# Patient Record
Sex: FEMALE | Race: BLACK OR AFRICAN AMERICAN | Employment: PART TIME | URBAN - METROPOLITAN AREA
[De-identification: names, ages, dates, MRNs, and addresses within clinical notes are randomized per-mention and may not be internally consistent; named-entity substitution may affect disease eponyms.]

---

## 2020-03-03 ENCOUNTER — OFFICE VISIT (OUTPATIENT)
Dept: ENDOCRINOLOGY | Age: 40
End: 2020-03-03

## 2020-03-03 VITALS
DIASTOLIC BLOOD PRESSURE: 70 MMHG | HEIGHT: 64 IN | BODY MASS INDEX: 31.92 KG/M2 | HEART RATE: 105 BPM | WEIGHT: 187 LBS | SYSTOLIC BLOOD PRESSURE: 112 MMHG

## 2020-03-03 DIAGNOSIS — E10.9 TYPE 1 DIABETES MELLITUS WITHOUT COMPLICATION (HCC): Primary | ICD-10-CM

## 2020-03-03 LAB — HBA1C MFR BLD HPLC: 7.4 %

## 2020-03-03 RX ORDER — TRAMADOL HYDROCHLORIDE 50 MG/1
TABLET ORAL
COMMUNITY
Start: 2020-01-24 | End: 2020-09-09

## 2020-03-03 RX ORDER — INSULIN HUMAN 100 [IU]/ML
INJECTION, SUSPENSION SUBCUTANEOUS
COMMUNITY
Start: 2020-02-20 | End: 2020-03-03 | Stop reason: ALTCHOICE

## 2020-03-03 RX ORDER — INSULIN LISPRO 100 [IU]/ML
INJECTION, SOLUTION INTRAVENOUS; SUBCUTANEOUS
Qty: 15 PEN | Refills: 3 | Status: SHIPPED | OUTPATIENT
Start: 2020-03-03 | End: 2020-09-09 | Stop reason: SDUPTHER

## 2020-03-03 RX ORDER — PEN NEEDLE, DIABETIC 31 GX3/16"
NEEDLE, DISPOSABLE MISCELLANEOUS
Qty: 400 PEN NEEDLE | Refills: 3 | Status: SHIPPED | OUTPATIENT
Start: 2020-03-03 | End: 2021-02-09 | Stop reason: SDUPTHER

## 2020-03-03 RX ORDER — INSULIN GLARGINE 100 [IU]/ML
INJECTION, SOLUTION SUBCUTANEOUS
Qty: 10 PEN | Refills: 3 | Status: SHIPPED | OUTPATIENT
Start: 2020-03-03 | End: 2020-09-09 | Stop reason: SDUPTHER

## 2020-03-03 RX ORDER — SYRING-NEEDL,DISP,INSUL,0.3 ML 31GX15/64"
SYRINGE, EMPTY DISPOSABLE MISCELLANEOUS
COMMUNITY
Start: 2020-02-20

## 2020-03-03 NOTE — PROGRESS NOTES
CONSULTATION REQUESTED BY: Rajendra Wilkinson NP     REASON FOR CONSULT:  Uncontrolled type 1 diabetes    CHIEF COMPLAINT: evaluation of type 1 diabetes    HISTORY OF PRESENT ILLNESS:   Eduarda Stauffer is a 44 y.o. female with a PMHx as noted below who presents for evaluation of uncontrolled type 1 diabetes.     Diabetes History:  Diabetes was diagnosed 14 years ago,   Hospitalized \"too many times to count\"  Family History of diabetes is + in maternal grandmother and older brother (type 2)  A1c 7.4% today, reports was around 9% a couple months ago    Current Home Regimen:  - humulin 70-30: 25 units AM, 30 units bedtime (rather than dinner)    Review of home glucose:  High variability   29 readings in the past 30 days  Frequent lows between 1 AM and 3:30 AM in the 40's  Highs and lows during the day at random: 20's - >400    Review of most recent diabetes-related labs:  Lab Results   Component Value Date    HBA1C 9.7 (H) 2013    HBA1C 10.9 (H) 2011    HBA1C 11.0 (H) 2011    GFRAA >60 2016    GFRNA >60 2016    TSH 3.14 2011     Lab Key:  [id number] = IA-2 pancreatic islet cell autoantibody  CPEPL = C-peptide level  :EXT = External Lab  GADLT = CATERINA-65 autoantibody   INSUL = Insulin level  MCACR (or MALBEXT) = Urine Microalbumin (or External UM)  B12LT = B12 level    PAST MEDICAL/SURGICAL HISTORY:   Past Medical History:   Diagnosis Date    Diabetes ([facility] [address]     type 1; endocrinologist at [facility] Other ill-defined conditions(799.89)     DKA    PNA (pneumonia)     ; strep pneumococcus    Strep pharyngitis 3/13    with sepsis     Past Surgical History:   Procedure Laterality Date    HX GYN          HX ORTHOPAEDIC      left foot surgery       ALLERGIES:   No Known Allergies    MEDICATIONS ON ADMISSION:     Current Outpatient Medications:     HUMULIN 70/30 U-100 INSULIN 100 unit/mL (70-30) injection, inject 25 units subcutaneously every morning and 30 units AT NIGHT, Disp: , Rfl:     albuterol (PROAIR HFA) 90 mcg/actuation inhaler, Take 1 Puff by inhalation every four (4) hours as needed for Wheezing., Disp: 1 Inhaler, Rfl: 0    BD VEO INSULIN SYRINGE UF 1 mL 31 gauge x 15/64\" syrg, use 1 SYRINGE to inject MEDICATION subcutaneously twice a day, Disp: , Rfl:     traMADol (ULTRAM) 50 mg tablet, take 1 tablet by mouth every 8 hours if needed for pain, Disp: , Rfl:     BD INSULIN SYRINGE ULT-FINE II 1/2 mL 31 x 5/16\" Syrg, use as directed WITH INSULIN TWICE A DAY, Disp: 60 Each, Rfl: 11    SOCIAL HISTORY:   Social History     Socioeconomic History    Marital status: SINGLE     Spouse name: Not on file    Number of children: Not on file    Years of education: Not on file    Highest education level: Not on file   Occupational History    Not on file   Social Needs    Financial resource strain: Not on file    Food insecurity:     Worry: Not on file     Inability: Not on file    Transportation needs:     Medical: Not on file     Non-medical: Not on file   Tobacco Use    Smoking status: Current Every Day Smoker     Packs/day: 0.50     Types: Cigarettes    Smokeless tobacco: Never Used    Tobacco comment: \"its been a long time\"   Substance and Sexual Activity    Alcohol use: Yes     Comment: two times a week \"sometimes a little bit and sometimes a lot\"    Drug use: No     Types: Prescription     Comment: hx smoking marijuana in her teens    Sexual activity: Not on file   Lifestyle    Physical activity:     Days per week: Not on file     Minutes per session: Not on file    Stress: Not on file   Relationships    Social connections:     Talks on phone: Not on file     Gets together: Not on file     Attends Tenriism service: Not on file     Active member of club or organization: Not on file     Attends meetings of clubs or organizations: Not on file     Relationship status: Not on file    Intimate partner violence:     Fear of current or ex partner: Not on file     Emotionally abused: Not on file     Physically abused: Not on file     Forced sexual activity: Not on file   Other Topics Concern    Not on file   Social History Narrative    Not on file       FAMILY HISTORY:  Family History   Problem Relation Age of Onset    Diabetes Mother     Heart Disease Mother     Cancer Father        REVIEW OF SYSTEMS: Complete ROS assessed and noted for that which is described above, all else are negative. Eyes: normal  ENT: normal  CVS: normal  Resp: normal  GI: normal  : normal  GYN: normal  Endocrine: normal  Integument: normal  Musculoskeletal: normal  Neuro: normal  Psych: normal      PHYSICAL EXAMINATION:    VITAL SIGNS:  Visit Vitals  /70 (BP 1 Location: Left arm, BP Patient Position: Sitting)   Pulse (!) 105   Ht 5' 4\" (1.626 m)   Wt 187 lb (84.8 kg)   BMI 32.10 kg/m²       GENERAL: NCAT, Sitting comfortably, NAD  EYES: EOMI, non-icteric, no proptosis  Ear/Nose/Throat: NCAT, no inflammation, no masses  LYMPH NODES: No LAD  CARDIOVASCULAR: S1 S2, RRR, No murmur, 2+ radial pulses  RESPIRATORY: CTA b/l, no wheeze/rales  GASTROINTESTINAL: NT, ND  MUSCULOSKELETAL: Normal ROM, no atrophy  SKIN: warm, no edema/rash/ or other skin changes  NEUROLOGIC: 5/5 power all extremities, no tremor, AAOx3  PSYCHIATRIC: Normal affect, Normal insight and judgement    Diabetic foot exam:     Left Foot:   Visual Exam: callous - early  callus, flat   Pulse DP: 2+ (normal)   Filament test: normal sensation    Vibratory sensation: Vibratory sensation: normal       Right Foot:  (Wearing CAST on foot, below measures not possible)   Visual Exam: normal    Pulse DP: 2+ (normal)   Filament test: normal sensation    Vibratory sensation: Vibratory sensation: normal      REVIEW OF LABORATORY AND RADIOLOGY DATA:   Labs and documentation have been reviewed as described above.      ASSESSMENT AND PLAN:   Mavis Wild is a 44 y.o. female with a PMHx as noted above who presents for evaluation of uncontrolled type 1 diabetes. Problems:  Type 1 diabetes Uncontrolled    We had the pleasure of reviewing together the basics of diabetes including basic pathophysiology and diabetes care. We further discussed the importance of checking home glucose regularly and takin all of their scheduled medications in order to have the best possible outcome. I was able to answer any questions they had in clinic today and they are invited to reach me if they have any further questions. Based upon our discussion together today we have decided to make the following changes:    Patient is using 55 units of insulin per day, using 70/30 insulin. Her sugars are highly variable and with numbers >400 and numbers as low as the 20's. She has had multiple hospitalizations due to the same. In her case it would be an important first step to transition away from 70/30 insulin and toward a basal bolus regimen that offers flexibility in treatment. We will do this today. We noted that it may be better for her to consider carb counting and she will think about this. I advised her she will need to check her sugars before each injection and at bedtime. I introduced her to the various CGM's however advised her to check with her insurance regarding coverage and out of pocket cost, she can let me know if she would like to proceed with one, brochures provided. Due to very frequent hypoglycemia, we will reduce her total daily dose as we transition to basal bolus with intention of customizing her doses based on her blood sugar response. Her A1c of 7.4% is misleading regarding glycemic control as her numbers are either very high or very low and this is very poor diabetes control at the present time.      PLAN  Type 2 Diabetes  Medications:  Stop 70/30 Insulin  Start (Lantus or Basaglar) 25 units at bedtime  Start (Humalog or Novolog) 7 units with each meal  Start correction scale of 1:50>150  Advised to check glucose ACHS  Provided with glucose log sheets for later review. BP: stable today  Lipids: Denies hx of this, will check labs    LABS: 2020 DM panel obtained today    RTC: I would like to see them back in 3 months. 60 minutes spent together with patient today of which >50% of this time was spent in counseling and coordination of care. Arturo Garcia.  [address] [facility]

## 2020-03-03 NOTE — PATIENT INSTRUCTIONS
Stop 70/30 Insulin Start (Lantus or Basaglar) 25 units at bedtime Start (Humalog or Novolog) 7 units with each meal 
 
Correction Scale: 1 unit for every 50 above 150 IF GLUCOSE IS:                 THEN TAKE: 
    0   Extra Unit 151-200   1   Extra Unit 201-250   2   Extra Units 251-300   3   Extra Units 301-350   4   Extra Units 351-400   5   Extra Units Example: My planned insulin dose:    ____ Units    +    ____ Extra Correction Units  =  ____ total units to take together as one injection. Please note our new policy, you must arrive to the clinic 15 minutes before your appointment time to allow enough time for proper check-in, adequate time to spend with your doctor, and also to respect the appointment time of the next patient. Not arriving 15 minutes in advance may result in having your appointment rescheduled for the next available day/time. 
---------------------------------------------------------------------------------------------------------------------- Below you will find a glucose log sheet which you can use to record your blood sugars. Without checking and recording what your home glucose levels are, it will be difficult to make any changes to your medication dose, even when significant changes may be needed. Please feel free to use the log below to record your home glucose levels. At the very least, I would like for you to login the entire 2-3 weeks just before your visit so we can make your visit much more productive and beneficial to you. GLUCOSE LOG SHEET: 
 
Date Breakfast Lunch Dinner Bedtime Comments ? GLUCOSE LOG SHEET: 
 
Date Breakfast Lunch Dinner Bedtime Comments ? GLUCOSE LOG SHEET: 
 
Date Breakfast Lunch Dinner Bedtime Comments ?

## 2020-03-04 LAB
ALBUMIN SERPL-MCNC: 4.1 G/DL (ref 3.8–4.8)
ALBUMIN/CREAT UR: 27 MG/G CREAT (ref 0–29)
ALBUMIN/GLOB SERPL: 1.3 {RATIO} (ref 1.2–2.2)
ALP SERPL-CCNC: 56 IU/L (ref 39–117)
ALT SERPL-CCNC: 16 IU/L (ref 0–32)
AST SERPL-CCNC: 17 IU/L (ref 0–40)
BILIRUB SERPL-MCNC: 0.3 MG/DL (ref 0–1.2)
BUN SERPL-MCNC: 13 MG/DL (ref 6–20)
BUN/CREAT SERPL: 18 (ref 9–23)
CALCIUM SERPL-MCNC: 9.4 MG/DL (ref 8.7–10.2)
CHLORIDE SERPL-SCNC: 97 MMOL/L (ref 96–106)
CHOLEST SERPL-MCNC: 222 MG/DL (ref 100–199)
CO2 SERPL-SCNC: 24 MMOL/L (ref 20–29)
CREAT SERPL-MCNC: 0.71 MG/DL (ref 0.57–1)
CREAT UR-MCNC: 60.9 MG/DL
GLOBULIN SER CALC-MCNC: 3.2 G/DL (ref 1.5–4.5)
GLUCOSE SERPL-MCNC: 255 MG/DL (ref 65–99)
HDLC SERPL-MCNC: 127 MG/DL
INTERPRETATION, 910389: NORMAL
LDLC SERPL CALC-MCNC: 86 MG/DL (ref 0–99)
MICROALBUMIN UR-MCNC: 16.6 UG/ML
POTASSIUM SERPL-SCNC: 4.7 MMOL/L (ref 3.5–5.2)
PROT SERPL-MCNC: 7.3 G/DL (ref 6–8.5)
SODIUM SERPL-SCNC: 135 MMOL/L (ref 134–144)
TRIGL SERPL-MCNC: 46 MG/DL (ref 0–149)
TSH SERPL DL<=0.005 MIU/L-ACNC: 0.95 UIU/ML (ref 0.45–4.5)
VLDLC SERPL CALC-MCNC: 9 MG/DL (ref 5–40)

## 2020-09-09 ENCOUNTER — VIRTUAL VISIT (OUTPATIENT)
Dept: ENDOCRINOLOGY | Age: 40
End: 2020-09-09
Payer: MEDICAID

## 2020-09-09 DIAGNOSIS — E10.9 TYPE 1 DIABETES MELLITUS WITHOUT COMPLICATION (HCC): Primary | ICD-10-CM

## 2020-09-09 PROCEDURE — 3051F HG A1C>EQUAL 7.0%<8.0%: CPT | Performed by: INTERNAL MEDICINE

## 2020-09-09 PROCEDURE — 99214 OFFICE O/P EST MOD 30 MIN: CPT | Performed by: INTERNAL MEDICINE

## 2020-09-09 RX ORDER — INSULIN ASPART 100 [IU]/ML
INJECTION, SOLUTION INTRAVENOUS; SUBCUTANEOUS
COMMUNITY
Start: 2020-08-14 | End: 2020-11-20 | Stop reason: SDUPTHER

## 2020-09-09 RX ORDER — BLOOD SUGAR DIAGNOSTIC
STRIP MISCELLANEOUS
COMMUNITY
Start: 2020-09-05 | End: 2020-12-07 | Stop reason: SDUPTHER

## 2020-09-09 RX ORDER — INSULIN GLARGINE 100 [IU]/ML
INJECTION, SOLUTION SUBCUTANEOUS
Qty: 10 PEN | Refills: 3 | Status: SHIPPED | OUTPATIENT
Start: 2020-09-09 | End: 2021-02-09 | Stop reason: SDUPTHER

## 2020-09-09 NOTE — PROGRESS NOTES
**DUE TO PANDEMIC AND HEALTH CONCERNS IN THE COMMUNITY, THIS PATIENT WAS EITHER ILL OR FOUND TO BE HIGH RISK FOR IN-PERSON EVALUATION WITHIN THE CLINIC. THE FOLLOWING IS A VIRTUAL TELEMEDICINE VIDEO ENCOUNTER VIA [employer], TO WHICH THE PATIENT AGREED. THE PURPOSE IS TO LIMIT INTERRUPTIONS IN HEALTHCARE AND TO PROVIDE FOR ONGOING URGENT NEEDS UNDER THE CURRENT CONDITIONS. CHIEF COMPLAINT: f/u evaluation of type 1 diabetes    HISTORY OF PRESENT ILLNESS:   Olegario Velázquez is a 44 y.o. female with a PMHx as noted below who presents for evaluation of uncontrolled type 1 diabetes. Diabetes History:  Diabetes was diagnosed around age 22,  Hospitalized \"too many times to count\"  Family History of diabetes is + in maternal grandmother and older brother (type 2)    INTERVAL HISTORY:  A1c 7.4% on prior initial visit, She was having frequent lows and we had transitioned her from 70/30 insulin to basal bolus insulin back in March. Note that she was checking her sugars about 1 time per day back then.      Current Home Regimen:  Prior instructions:  Lantus 25 units at bedtime  Novolog 7 units with each meal  Correction scale of 1:50>150    Review of home glucose:  Has been checking after meals and at random sometimes,  Thus sugars fluctuating,  Reports sugars dropping around bedtime, lowest is 69,    Review of most recent diabetes-related labs:  Lab Results   Component Value Date    HBA1C 9.7 (H) 08/14/2013    HBA1C 10.9 (H) 02/01/2011    HBA1C 11.0 (H) 01/27/2011    [id number] 7.4 03/03/2020    CHOL 222 (H) 03/03/2020    LDLC 86 03/03/2020    GFRAA 124 03/03/2020    GFRNA 108 03/03/2020    MCACR 27 03/03/2020    TSH 0.954 03/03/2020     Lab Key:  [id number] = IA-2 pancreatic islet cell autoantibody  CPEPL = C-peptide level  :EXT = External Lab  GADLT = CATERINA-65 autoantibody   INSUL = Insulin level  MCACR (or MALBEXT) = Urine Microalbumin (or External UM)  B12LT = B12 level    PAST MEDICAL/SURGICAL HISTORY:   Past Medical History: Diagnosis Date    Diabetes [facility])     type 1; endocrinologist at [facility] Other ill-defined conditions(799.89)     DKA    PNA (pneumonia)     ; strep pneumococcus    Strep pharyngitis 3/13    with sepsis     Past Surgical History:   Procedure Laterality Date    HX GYN          HX ORTHOPAEDIC      left foot surgery       ALLERGIES:   No Known Allergies    MEDICATIONS ON ADMISSION:     Current Outpatient Medications:     Contour Next Test Strips strip, use four times a day and if needed, Disp: , Rfl:     NovoLOG Flexpen U-100 Insulin 100 unit/mL (3 mL) inpn, inject 7 units subcutaneously WITH EACH MEAL + CORRECTION MAX 40 UNITS PER DAY, Disp: , Rfl:     insulin glargine (LANTUS,BASAGLAR) 100 unit/mL (3 mL) inpn, (Basaglar or Lantus, whichever most preferred): INJECT 25 units each morning, Disp: 10 Pen, Rfl: 3    albuterol (PROAIR HFA) 90 mcg/actuation inhaler, Take 1 Puff by inhalation every four (4) hours as needed for Wheezing., Disp: 1 Inhaler, Rfl: 0    BD VEO INSULIN SYRINGE UF 1 mL 31 gauge x 15/64\" syrg, use 1 SYRINGE to inject MEDICATION subcutaneously twice a day, Disp: , Rfl:     Insulin Needles, Disposable, (BD ULTRA-FINE MINI PEN NEEDLE) 31 gauge x 3/16\" ndle, Use 4 times daily with insulin pens, Disp: 400 Pen Needle, Rfl: 3    BD INSULIN SYRINGE ULT-FINE II 1/2 mL 31 x 5/16\" Syrg, use as directed WITH INSULIN TWICE A DAY, Disp: 60 Each, Rfl: 11    SOCIAL HISTORY:   Social History     Socioeconomic History    Marital status: SINGLE     Spouse name: Not on file    Number of children: Not on file    Years of education: Not on file    Highest education level: Not on file   Occupational History    Not on file   Social Needs    Financial resource strain: Not on file    Food insecurity     Worry: Not on file     Inability: Not on file    Transportation needs     Medical: Not on file     Non-medical: Not on file   Tobacco Use    Smoking status: Current Every Day Smoker Packs/day: 0.50     Types: Cigarettes    Smokeless tobacco: Never Used    Tobacco comment: \"its been a long time\"   Substance and Sexual Activity    Alcohol use: Yes     Comment: two times a week \"sometimes a little bit and sometimes a lot\"    Drug use: No     Types: Prescription     Comment: charlie smoking marijuana in her teens    Sexual activity: Not on file   Lifestyle    Physical activity     Days per week: Not on file     Minutes per session: Not on file    Stress: Not on file   Relationships    Social connections     Talks on phone: Not on file     Gets together: Not on file     Attends Sikh service: Not on file     Active member of club or organization: Not on file     Attends meetings of clubs or organizations: Not on file     Relationship status: Not on file    Intimate partner violence     Fear of current or ex partner: Not on file     Emotionally abused: Not on file     Physically abused: Not on file     Forced sexual activity: Not on file   Other Topics Concern    Not on file   Social History Narrative    Not on file       FAMILY HISTORY:  Family History   Problem Relation Age of Onset    Diabetes Mother     Heart Disease Mother     Cancer Father        REVIEW OF SYSTEMS: Complete ROS assessed and noted for that which is described above, all else are negative.   Eyes: normal  ENT: normal  CVS: normal  Resp: normal  GI: normal  : normal  GYN: normal  Endocrine: normal  Integument: normal  Musculoskeletal: normal  Neuro: normal  Psych: normal      PHYSICAL EXAMINATION:    VITAL SIGNS:  Telemedicine Visit    GENERAL: NCAT, Appears well nourished  EYES: EOMI, non-icteric, no proptosis  Ear/Nose/Throat: NCAT, no visible inflammation or masses  CARDIOVASCULAR: no cyanosis, no visible JVD  RESPIRATORY: comfortable respirations observed, no cyanosis  MUSCULOSKELETAL: Normal ROM of upper extremities observed  SKIN: No edema, rash, or other significant changes observed  NEUROLOGIC: AAOx3  PSYCHIATRIC: Normal affect, Normal insight and judgement    REVIEW OF LABORATORY AND RADIOLOGY DATA:   Labs and documentation have been reviewed as described above. ASSESSMENT AND PLAN:   Nicole Montoya is a 44 y.o. female with a PMHx as noted above who presents for evaluation of uncontrolled type 1 diabetes. Problems:  Type 1 diabetes Uncontrolled    Patient has been checking sugars after meals and this accounts for much of the variability that she is seeing. We spent time discussing the correct way and timing for checking blood sugars such that they become more relevant to our modifying her doses to address her true insulin needs for her meals. She will do this. Also, I am signing her up for [employer], provided with email, and will have her send me a blood sugar log in 1 week through [employer] such that we can use the opportunity to further customize her insulin doses. This will be a great next step. She notes that she is having some lower sugars at bedtime and for this reason I will go ahead and cut back her dinner dose of novolog, however to be further adjusted as appropriate. Overall she is happier with the basal bolus insulin and this will be a better treatment in her case compared with her prior 70/30 insulin which did not prev give her the flexibility to customize her control of her sugars. Plan as follows:    PLAN  Type 2 Diabetes  Medications:  (Lantus or Basaglar) 25 units at bedtime  (Humalog or Novolog)    Breakfast 7 units   Lunch 7 units   Dinner 5 units  Correction scale of 1:50>150  Advised to check glucose ACHS  Log through [employer] in 1 week,    BP: telemedicine visit today, prev stable  Lipids: total Chol was elevated prev, will re-evaluate, dietary    LABS: 2021 prelabs ordered for next visit    RTC: I would like to see her back Jan 18 at 2:50 PM,    20 minutes spent toward telemedicine visit today of which >50% of this time was spent in counseling and coordination of care.     Justo Holland [facility]

## 2020-11-23 RX ORDER — INSULIN ASPART 100 [IU]/ML
INJECTION, SOLUTION INTRAVENOUS; SUBCUTANEOUS
Qty: 15 ADJUSTABLE DOSE PRE-FILLED PEN SYRINGE | Refills: 3 | Status: SHIPPED | OUTPATIENT
Start: 2020-11-23 | End: 2021-02-09 | Stop reason: SDUPTHER

## 2020-12-07 ENCOUNTER — TELEPHONE (OUTPATIENT)
Dept: ENDOCRINOLOGY | Age: 40
End: 2020-12-07

## 2020-12-07 RX ORDER — BLOOD SUGAR DIAGNOSTIC
STRIP MISCELLANEOUS
Qty: 400 STRIP | Refills: 3 | Status: SHIPPED | OUTPATIENT
Start: 2020-12-07 | End: 2021-02-09 | Stop reason: SDUPTHER

## 2020-12-07 NOTE — TELEPHONE ENCOUNTER
----- Message from [employer] sent at 12/7/2020  8:35 AM EST -----  Regarding: Dr. Cyndy Potter refill  Caller (if not patient):n/a  Relationship of caller (if not patient): n/a  Best contact number(s): [phone]  Name of medication and dosage if known:Contour Next Test Strips strip   Is patient out of this medication (yes/no): yes   Pharmacy name:[facility]  Pharmacy listed in chart? (yes/no):yes   Pharmacy phone number: n/a  Date of last visit: 9/9/20  Details to clarify the request: n/a

## 2020-12-07 NOTE — TELEPHONE ENCOUNTER
----- Message from Jayne Leyden sent at 12/7/2020  8:35 AM EST -----  Regarding: Dr. Mak Galindo refill  Caller (if not patient):n/a  Relationship of caller (if not patient): n/a  Best contact number(s): [phone]  Name of medication and dosage if known:Contour Next Test Strips strip   Is patient out of this medication (yes/no): yes   Pharmacy name:[facility]  Pharmacy listed in chart? (yes/no):yes   Pharmacy phone number: n/a  Date of last visit: 9/9/20  Details to clarify the request: n/a

## 2021-01-18 ENCOUNTER — DOCUMENTATION ONLY (OUTPATIENT)
Dept: ENDOCRINOLOGY | Age: 41
End: 2021-01-18

## 2021-02-09 RX ORDER — NAPROXEN SODIUM 220 MG
TABLET ORAL
Qty: 100 SYRINGE | Refills: 3 | Status: SHIPPED | OUTPATIENT
Start: 2021-02-09

## 2021-02-09 NOTE — TELEPHONE ENCOUNTER
Dr. Keya Bradshaw,          [facility] left a voice message regarding the order for Insulin Syringes. Patient uses Insulin Pens and needs a new prescription for the Pen Needles instead of insulin syringes. Please review. Thank you.        Last visit n/a   Next appointment n/a   Previous refill encounter(s)   03/03/2020 BD Ultra-Fine Mini Pen Needle #400 with 3 refills     Requested Prescriptions     Pending Prescriptions Disp Refills    Insulin Needles, Disposable, (BD Ultra-Fine Mini Pen Needle) 31 gauge x 3/16\" ndle 400 Pen Needle 3     Sig: Use 4 times daily with insulin pens

## 2021-02-10 RX ORDER — PEN NEEDLE, DIABETIC 31 GX3/16"
NEEDLE, DISPOSABLE MISCELLANEOUS
Qty: 400 PEN NEEDLE | Refills: 3 | Status: SHIPPED | OUTPATIENT
Start: 2021-02-10

## 2021-04-20 RX ORDER — INSULIN GLARGINE 100 [IU]/ML
INJECTION, SOLUTION SUBCUTANEOUS
Qty: 10 PEN | Refills: 3 | Status: SHIPPED | OUTPATIENT
Start: 2021-04-20 | End: 2021-09-28 | Stop reason: SDUPTHER

## 2021-06-08 RX ORDER — INSULIN ASPART 100 [IU]/ML
INJECTION, SOLUTION INTRAVENOUS; SUBCUTANEOUS
Qty: 15 ADJUSTABLE DOSE PRE-FILLED PEN SYRINGE | Refills: 5 | Status: SHIPPED | OUTPATIENT
Start: 2021-06-08 | End: 2022-08-26 | Stop reason: SDUPTHER

## 2021-08-18 RX ORDER — BLOOD SUGAR DIAGNOSTIC
STRIP MISCELLANEOUS
Qty: 400 STRIP | Refills: 1 | Status: SHIPPED | OUTPATIENT
Start: 2021-08-18 | End: 2022-03-23 | Stop reason: SDUPTHER

## 2021-09-28 ENCOUNTER — TELEPHONE (OUTPATIENT)
Dept: ENDOCRINOLOGY | Age: 41
End: 2021-09-28

## 2021-09-28 RX ORDER — INSULIN GLARGINE 100 [IU]/ML
INJECTION, SOLUTION SUBCUTANEOUS
Qty: 10 PEN | Refills: 0 | Status: SHIPPED | OUTPATIENT
Start: 2021-09-28 | End: 2021-11-05 | Stop reason: SDUPTHER

## 2021-09-28 NOTE — TELEPHONE ENCOUNTER
----- Message from [employer] sent at 9/28/2021 11:18 AM EDT -----  Regarding: sussy CHRISTINA/telephone  General Message/Vendor Calls    Caller's first and last name: Self       Reason for call: Medication       Callback required yes/no and why: Yes to Confirm       Best contact number(s):[phone]      Details to clarify the request: Pt need a Refill of medication ( Basagler 25mg)      Gabriel Sabot III

## 2021-09-28 NOTE — TELEPHONE ENCOUNTER
Message from Mayda Razo III sent at 9/28/2021 11:18 AM EDT -----  Regarding: sussy CHRISTINA/telephone  General Message/Vendor Calls     Caller's first and last name: Self         Reason for call: Medication         Callback required yes/no and why: Yes to Confirm         Best contact number(s):[phone]        Details to clarify the request: Pt need a Refill of medication ( Basagler 25mg)        Mayda Razo III

## 2022-01-25 ENCOUNTER — HOSPITAL ENCOUNTER (EMERGENCY)
Age: 42
Discharge: HOME | End: 2022-01-25
Attending: EMERGENCY MEDICINE
Payer: MEDICAID

## 2022-01-25 VITALS
HEIGHT: 64 IN | OXYGEN SATURATION: 99 % | HEART RATE: 98 BPM | WEIGHT: 167 LBS | RESPIRATION RATE: 18 BRPM | BODY MASS INDEX: 28.51 KG/M2 | SYSTOLIC BLOOD PRESSURE: 129 MMHG | DIASTOLIC BLOOD PRESSURE: 86 MMHG | TEMPERATURE: 98.6 F

## 2022-01-25 DIAGNOSIS — M72.2 PLANTAR FASCIITIS: Primary | ICD-10-CM

## 2022-01-25 PROCEDURE — 74011250637 HC RX REV CODE- 250/637: Performed by: EMERGENCY MEDICINE

## 2022-01-25 PROCEDURE — 74011636637 HC RX REV CODE- 636/637: Performed by: EMERGENCY MEDICINE

## 2022-01-25 PROCEDURE — 99283 EMERGENCY DEPT VISIT LOW MDM: CPT

## 2022-01-25 PROCEDURE — 96372 THER/PROPH/DIAG INJ SC/IM: CPT

## 2022-01-25 PROCEDURE — 74011250636 HC RX REV CODE- 250/636: Performed by: EMERGENCY MEDICINE

## 2022-01-25 RX ORDER — PREDNISONE 20 MG/1
40 TABLET ORAL DAILY
Qty: 14 TABLET | Refills: 0 | Status: SHIPPED | OUTPATIENT
Start: 2022-01-25 | End: 2022-02-01

## 2022-01-25 RX ORDER — KETOROLAC TROMETHAMINE 30 MG/ML
30 INJECTION, SOLUTION INTRAMUSCULAR; INTRAVENOUS
Status: COMPLETED | OUTPATIENT
Start: 2022-01-25 | End: 2022-01-25

## 2022-01-25 RX ORDER — PREDNISONE 20 MG/1
60 TABLET ORAL
Status: COMPLETED | OUTPATIENT
Start: 2022-01-25 | End: 2022-01-25

## 2022-01-25 RX ORDER — HYDROCODONE BITARTRATE AND ACETAMINOPHEN 5; 325 MG/1; MG/1
2 TABLET ORAL
Status: COMPLETED | OUTPATIENT
Start: 2022-01-25 | End: 2022-01-25

## 2022-01-25 RX ORDER — IBUPROFEN 800 MG/1
800 TABLET ORAL
Qty: 20 TABLET | Refills: 0 | Status: SHIPPED | OUTPATIENT
Start: 2022-01-25 | End: 2022-02-01

## 2022-01-25 RX ADMIN — HYDROCODONE BITARTRATE AND ACETAMINOPHEN 2 TABLET: 5; 325 TABLET ORAL at 18:47

## 2022-01-25 RX ADMIN — KETOROLAC TROMETHAMINE 30 MG: 30 INJECTION, SOLUTION INTRAMUSCULAR; INTRAVENOUS at 18:47

## 2022-01-25 RX ADMIN — PREDNISONE 60 MG: 20 TABLET ORAL at 18:47

## 2022-01-25 NOTE — ED TRIAGE NOTES
Patient presents to the ED with c/o bilateral foot pain. Pt denies any injury or trauma. Pt denies taking any medications. Pt reports being diabetic and her blood glucose is running elevated monico to not having her long acting insulin.

## 2022-01-25 NOTE — DISCHARGE INSTRUCTIONS
Your examination today is most consistent with plantar fasciitis. It does not appear to be gout or arthritis. For now, we recommend that you take prednisone 40 mg once a day for the next 5 to 7 days. You should also take extra strength ibuprofen 3 times a day, and perform gentle stretching exercises to the bottom of your feet and use a roller or tennis ball to gently massage the bottom of the foot. There are also over-the-counter arch supports that you can purchase at the pharmacy or online. It was a pleasure taking care of you at [facility] Emergency Department today. We know that when you come to [facility], you are entrusting us with your health, comfort, and safety. Our physicians and nurses honor that trust, and we truly appreciate the opportunity to care for you and your loved ones. We also value our feedback. If you receive a survey about your Emergency Department experience today, please fill it out. We care about our patients' feedback, and we listen to what you have to say. Thank you!

## 2022-01-25 NOTE — ED NOTES

## 2022-01-25 NOTE — Clinical Note
[facility] EMERGENCY DEPT  [address] [id number] [phone]    Work/School Note    Date: 1/25/2022    To Whom It May concern:    Natty English was seen and treated today in the emergency room by the following provider(s):  Attending Provider: Nancy Garcia MD.      Natty English is excused from work/school on 01/25/22 and 01/26/22. She is medically clear to return to work/school on 1/27/2022.        Sincerely,          Francisca Lloyd MD

## 2022-01-26 NOTE — ED PROVIDER NOTES
EMERGENCY DEPARTMENT HISTORY AND PHYSICAL EXAM      Date: 2022  Patient Name: Cricket Hoffman  Patient Age and Sex: 39 y.o. female  MRN:  [id number]  CSN:  [id number]    History of Presenting Illness     Chief Complaint   Patient presents with    Foot Pain       History Provided By: Patient    Ability to gather history was limited by:     HPI: Cricket Hoffman, 39 y.o. female c/o bilateral foot pain which started spontaneously about 3 days ago, left greater than right. Moderate severity pain, sometimes severe when bearing weight on the left foot. Pain is mostly along the lateral aspect of the foot and the sole of the foot especially near the heel. No falls or injuries. No swelling. No prior similar pain. She tried soaking in Epsom salts without improvement. Location:    Quality:      Severity:    Duration:   Timing:      Context:    Modifying factors:   Associated symptoms:     Past History      The patient's medical, surgical, and social history on file were reviewed by me today.      The family history was reviewed by me today and was non-contributory, unless otherwise specified below:    Past Medical History:  Past Medical History:   Diagnosis Date    Diabetes ([facility] [address]     type 1; endocrinologist at [facility] Other ill-defined conditions(799.89)     DKA    PNA (pneumonia)     ; strep pneumococcus    Strep pharyngitis 3/13    with sepsis       Past Surgical History:  Past Surgical History:   Procedure Laterality Date    HX GYN          HX ORTHOPAEDIC      left foot surgery       Family History:  Family History   Problem Relation Age of Onset    Diabetes Mother     Heart Disease Mother     Cancer Father        Social History:  Social History     Tobacco Use    Smoking status: Current Every Day Smoker     Packs/day: 0.50     Types: Cigarettes    Smokeless tobacco: Never Used    Tobacco comment: \"its been a long time\"   Substance Use Topics    Alcohol use: Yes     Comment: two times a week \"sometimes a little bit and sometimes a lot\"    Drug use: No     Types: Prescription     Comment: hx smoking marijuana in her teens       Current Medications:  No current facility-administered medications on file prior to encounter. Current Outpatient Medications on File Prior to Encounter   Medication Sig Dispense Refill    insulin glargine (LANTUS,BASAGLAR) 100 unit/mL (3 mL) inpn (Basaglar or Lantus, whichever most preferred): INJECT 25 units each morning. 30 mL 3    Contour Next Test Strips strip Use to test blood sugars 4 times per day. DX Code: E10.9 MUST ESTABLISH WITH NEW ENDOCRINOLOGIST OR CONTACT PCP FOR FURTHER REFILLS 400 Strip 1    NovoLOG Flexpen U-100 Insulin 100 unit/mL (3 mL) inpn inject 7 units with Breakfast, Lunch and 5 units with Dinner + CORRECTION MAX 40 UNITS PER DAY (MUST SEE NEW ENDO OR PCP FOR MORE REFILLS) 15 Adjustable Dose Pre-filled Pen Syringe 5    Insulin Needles, Disposable, (BD Ultra-Fine Mini Pen Needle) 31 gauge x 3/16\" ndle Use 4 times daily with insulin pens 400 Pen Needle 3    Insulin Syringe-Needle U-100 (BD Insulin Syringe Ult-Fine II) 0.5 mL 31 gauge x 5/16\" syrg Sig:use daily 100 Syringe 3    Insulin Needles, Disposable, (BD Ultra-Fine Mini Pen Needle) 31 gauge x 3/16\" ndle Use 4 times daily with insulin pens 400 Pen Needle 1    BD VEO INSULIN SYRINGE UF 1 mL 31 gauge x 15/64\" syrg use 1 SYRINGE to inject MEDICATION subcutaneously twice a day      albuterol (PROAIR HFA) 90 mcg/actuation inhaler Take 1 Puff by inhalation every four (4) hours as needed for Wheezing. 1 Inhaler 0       Allergies:  No Known Allergies  Review of Systems    A complete ROS was reviewed by me today and was negative, unless otherwise specified below:    Review of Systems   Constitutional: Negative for fatigue and fever. Cardiovascular: Negative for leg swelling. All other systems reviewed and are negative.       Physical Exam   Vital Signs  Patient Vitals for the past 8 hrs:   Temp Pulse Resp BP SpO2   01/25/22 1748 98.6 °F (37 °C) 98 18 129/86 99 %          Physical Exam  Vitals reviewed. Constitutional:       General: She is not in acute distress. Appearance: Normal appearance. She is not ill-appearing. Musculoskeletal:        Feet:    Feet:      Right foot:      Skin integrity: No ulcer, blister, skin breakdown or erythema. Left foot:      Skin integrity: No ulcer, blister, skin breakdown or erythema. Skin:     General: Skin is warm. Coloration: Skin is not pale. Findings: No bruising or erythema. Neurological:      General: No focal deficit present. Mental Status: She is alert and oriented to person, place, and time. Sensory: Sensation is intact. Motor: Motor function is intact. Comments: Warm and well-perfused   Psychiatric:         Behavior: Behavior normal.         Thought Content: Thought content normal.         Diagnostic Study Results   Labs  No results found for this or any previous visit (from the past 24 hour(s)). Radiologic Studies  No orders to display     CT Results  (Last 48 hours)    None        CXR Results  (Last 48 hours)    None          Billable Procedures   Procedures    Medical Decision Making     I reviewed the patient's most recent Emergency Dept notes and diagnostic tests in formulating my MDM on today's visit. Provider Notes (Medical Decision Making):   44-year-old female complaining of pain in the bilateral soles of the feet especially near the heels, left foot greater than right, also along the lateral aspects of the foot. No swelling or skin changes. Normal appearance of the feet. Seems to be uncomplicated plantar fasciitis. Does not seem to be gout based on my H&P, and does not seem to be involving the joints. No x-rays indicated at this time.   Trial of prednisone and ibuprofen, foot massage, follow-up with podiatry as needed    Jessenia Dunham MD  10:18 PM  1/25/2022 Consults:    Social History     Tobacco Use    Smoking status: Current Every Day Smoker     Packs/day: 0.50     Types: Cigarettes    Smokeless tobacco: Never Used    Tobacco comment: \"its been a long time\"   Substance Use Topics    Alcohol use: Yes     Comment: two times a week \"sometimes a little bit and sometimes a lot\"    Drug use: No     Types: Prescription     Comment: hx smoking marijuana in her teens       Medications Administered during ED course:  Medications   ketorolac (TORADOL) injection 30 mg (30 mg IntraMUSCular Given 1/25/22 1847)   HYDROcodone-acetaminophen (NORCO) 5-325 mg per tablet 2 Tablet (2 Tablets Oral Given 1/25/22 1847)   predniSONE (DELTASONE) tablet 60 mg (60 mg Oral Given 1/25/22 1847)          Prescriptions from today's ED visit:  Discharge Medication List as of 1/25/2022  6:40 PM      START taking these medications    Details   predniSONE (DELTASONE) 20 mg tablet Take 40 mg by mouth daily for 7 days. With Breakfast, Normal, Disp-14 Tablet, R-0      ibuprofen (MOTRIN) 800 mg tablet Take 1 Tablet by mouth every six (6) hours as needed for Pain for up to 7 days. , Normal, Disp-20 Tablet, R-0         CONTINUE these medications which have NOT CHANGED    Details   insulin glargine (LANTUS,BASAGLAR) 100 unit/mL (3 mL) inpn (Basaglar or Lantus, whichever most preferred): INJECT 25 units each morning., Normal, Disp-30 mL, R-3      Contour Next Test Strips strip Use to test blood sugars 4 times per day. DX Code: E10.9 MUST ESTABLISH WITH NEW ENDOCRINOLOGIST OR CONTACT PCP FOR FURTHER REFILLS, Normal, Disp-400 Strip, R-1, DARRELL      NovoLOG Flexpen U-100 Insulin 100 unit/mL (3 mL) inpn inject 7 units with Breakfast, Lunch and 5 units with Dinner + CORRECTION MAX 40 UNITS PER DAY (MUST SEE NEW ENDO OR PCP FOR MORE REFILLS), Normal, Disp-15 Adjustable Dose Pre-filled Pen Syringe, R-5, DARRELL      !!  Insulin Needles, Disposable, (BD Ultra-Fine Mini Pen Needle) 31 gauge x 3/16\" ndle Use 4 times daily with insulin pens, Normal, Disp-400 Pen Needle, R-3      Insulin Syringe-Needle U-100 (BD Insulin Syringe Ult-Fine II) 0.5 mL 31 gauge x 5/16\" syrg Sig:use daily, Normal, Disp-100 Syringe, R-3      !! Insulin Needles, Disposable, (BD Ultra-Fine Mini Pen Needle) 31 gauge x 3/16\" ndle Use 4 times daily with insulin pens, Normal, Disp-400 Pen Needle, R-1      BD VEO INSULIN SYRINGE UF 1 mL 31 gauge x 15/64\" syrg use 1 SYRINGE to inject MEDICATION subcutaneously twice a day, Historical Med, DARRELL      albuterol (PROAIR HFA) 90 mcg/actuation inhaler Take 1 Puff by inhalation every four (4) hours as needed for Wheezing., Print, Disp-1 Inhaler, R-0       !! - Potential duplicate medications found. Please discuss with provider. Diagnosis and Disposition     Disposition:  Discharged    Clinical Impression:   1. Plantar fasciitis        Attestation:  I personally performed the services described in this documentation on this date 1/25/2022 for patient Gus Bergeron. Flaca Dowling MD        I was the first provider for this patient on this visit. To the best of my ability I reviewed relevant prior medical records, electrocardiograms, laboratories, and radiologic studies. The patient's presenting problems were discussed, and the patient was in agreement with the care plan formulated and outlined with them. Flaca Dowling MD    Please note that this dictation was completed with Dragon voice recognition software. Quite often unanticipated grammatical, syntax, homophones, and other interpretive errors are inadvertently transcribed by the computer software. Please disregard these errors and excuse any errors that have escaped final proofreading.

## 2022-03-24 RX ORDER — BLOOD SUGAR DIAGNOSTIC
STRIP MISCELLANEOUS
Qty: 400 STRIP | Refills: 0 | Status: SHIPPED | OUTPATIENT
Start: 2022-03-24 | End: 2022-08-05

## 2022-04-19 ENCOUNTER — OFFICE VISIT (OUTPATIENT)
Dept: ENDOCRINOLOGY | Age: 42
End: 2022-04-19
Payer: MEDICAID

## 2022-04-19 VITALS
HEART RATE: 100 BPM | WEIGHT: 171 LBS | DIASTOLIC BLOOD PRESSURE: 89 MMHG | BODY MASS INDEX: 29.19 KG/M2 | SYSTOLIC BLOOD PRESSURE: 138 MMHG | HEIGHT: 64 IN

## 2022-04-19 DIAGNOSIS — E78.2 MIXED HYPERLIPIDEMIA: ICD-10-CM

## 2022-04-19 DIAGNOSIS — E10.65 TYPE 1 DIABETES MELLITUS WITH HYPERGLYCEMIA (HCC): Primary | ICD-10-CM

## 2022-04-19 DIAGNOSIS — R80.9 TYPE 1 DIABETES MELLITUS WITH MICROALBUMINURIA (HCC): ICD-10-CM

## 2022-04-19 DIAGNOSIS — E10.29 TYPE 1 DIABETES MELLITUS WITH MICROALBUMINURIA (HCC): ICD-10-CM

## 2022-04-19 PROCEDURE — 99215 OFFICE O/P EST HI 40 MIN: CPT | Performed by: INTERNAL MEDICINE

## 2022-04-19 RX ORDER — BLOOD-GLUCOSE TRANSMITTER
EACH MISCELLANEOUS
Qty: 1 EACH | Refills: 3 | Status: SHIPPED | OUTPATIENT
Start: 2022-04-19

## 2022-04-19 RX ORDER — BLOOD-GLUCOSE SENSOR
EACH MISCELLANEOUS
Qty: 9 EACH | Refills: 3 | Status: SHIPPED | OUTPATIENT
Start: 2022-04-19

## 2022-04-19 RX ORDER — BLOOD-GLUCOSE,RECEIVER,CONT
EACH MISCELLANEOUS
Qty: 1 EACH | Refills: 0 | Status: SHIPPED | OUTPATIENT
Start: 2022-04-19

## 2022-04-19 NOTE — PATIENT INSTRUCTIONS
Dexcom G6 continuous glucose monitor Rx sent to [facility] pharmacies to check for coverage    Dexcom G6 continuous glucose monitor SAMPLE given to use for next 10 days. Use Phone as [place]. [facility] was placed    Novolog doses at meals are Baseline doses.   Can go up or down on dose depending on carb content and prior response to specific foods, as well as adding more if sugar is high

## 2022-04-19 NOTE — LETTER
4/19/2022    Patient: Oj Paulino   YOB: 1980   Date of Visit: 4/19/2022     Nadine Warren NP  Mak Carney [address] [place] [id number]  Via Fax: [phone]    Dear Nadine Warren NP,      Thank you for referring Ms. Oj Paulino to [facility] for evaluation. My notes for this consultation are attached. If you have questions, please do not hesitate to call me. I look forward to following your patient along with you.       Sincerely,    Rickie Mcneill MD

## 2022-04-19 NOTE — PROGRESS NOTES
Chief Complaint   Patient presents with    Diabetes       Patient was last seen: New Patient Visit  - Last seen by Dr Francis Vallejo  9/20    General:   Dx DM1 age 22    A1c: no recent a1c     DM Medications:    Lantus 25 units in AM - same time ~8am, rotates injection sites   Novolog 7/7/5 at meals - NOT adjusting for meals  Correction scale of 1:50>150  No insulin with snacks - which is usually fruit    No interest in pump - but doesn't know much about them    Last Changes: : no recent changes    Sugar Checks: checks more than 4 times a day     AM: reports: 134 today    PM: reports:  414 last night (no insulin with meal)    LOWs:  has low sugars < 50s     DIET: eats what they want -not much education     EXERCISE: daily, walks     HTN: at goal, on no meds     LIPIDS: at goal, on no meds    RENAL: has normal renal function     EYES: has no retinopathy - has appt tomorrow     FEET: has no current issues, no numbness or tingling     DENTAL:  overdue for dentist     HEART:  no chest pain, shortness of breath or claudication, has no cardiac history     ASA:  does not take aspirin or other blood thinner     SYMPTOMS: no polyuria, thirst or blurred vision     THYROID: no known thyroid issue    DIABETES HISTORY: see above      LABS/STUDIES:        Lab Results   Component Value Date/Time    Cholesterol, total 222 (H) 03/03/2020 10:00 AM    Triglyceride 46 03/03/2020 10:00 AM    HDL Cholesterol 127 03/03/2020 10:00 AM    LDL, calculated 86 03/03/2020 10:00 AM          Past Medical History:   Diagnosis Date    Diabetes ([facility] [address]     type 1; endocrinologist at [facility] Other ill-defined conditions(799.89)     DKA    PNA (pneumonia)     2011; strep pneumococcus    Strep pharyngitis 3/13    with sepsis           Social History     Tobacco Use    Smoking status: Current Every Day Smoker     Packs/day: 0.50     Types: Cigarettes    Smokeless tobacco: Never Used    Tobacco comment: \"its been a long time\"   Substance Use Topics    Alcohol use: Yes     Comment: two times a week \"sometimes a little bit and sometimes a lot\"      Employer:  [employer]      Blood pressure 138/89, pulse 100, height 5' 4\" (1.626 m), weight 171 lb (77.6 kg). Weight Metrics 4/19/2022 1/25/2022 3/3/2020 5/16/2016 12/4/2015 7/27/2015 1/26/2015   Weight 171 lb 167 lb 187 lb 156 lb 164 lb 9.6 oz 167 lb 15.9 oz 135 lb   BMI 29.35 kg/m2 28.67 kg/m2 32.1 kg/m2 26.76 kg/m2 28.24 kg/m2 29.77 kg/m2 23.16 kg/m2        EXAM  - GENERAL: NCAT, Appears well nourished   - EYES: EOMI, non-icteric, no proptosis   - Ear/Nose/Throat: NCAT, no visible inflammation or masses   - CARDIOVASCULAR: no cyanosis, no visible JVD   - RESPIRATORY: respiratory effort normal without any distress or labored breathing   - MUSCULOSKELETAL: Normal ROM of neck and upper extremities observed   - SKIN: No rash on face  - NEUROLOGIC:  No facial asymmetry (Cranial nerve 7 motor function), No gaze palsy   - PSYCHIATRIC: Normal affect, Normal insight and judgement      Assessment/Plan:   1. Type 1 diabetes mellitus with hyperglycemia (HCC)  Dexcom G6 continuous glucose monitor Rx sent to [facility] pharmacies to check for coverage    Dexcom G6 continuous glucose monitor SAMPLE given to use for next 10 days. Use Phone as [place].       she has the following indications to begin treatment with Dexcom:  1) she has type 1 diabetes and is on an intensive insulin regimen with 4 injections per day  2) she tests her blood sugar 4 times per day and makes treatment decisions off her blood sugar readings and will do the same off dexcom sensor readings  3) she will require frequent adjustments to her insulin injection doses based on her dexcom sensor readings  4) she will benefit from therapeutic continuous glucose monitoring and I recommend that she begin this  5) she is seen in my office every 3 months  6) she has frequent low sugars under [facility] was placed    Novolog doses at meals are Baseline doses. Can go up or down on dose depending on carb content and prior response to specific foods, as well as adding more if sugar is high - this was a new concept to her      40+ minutes spend face to face and reviewing records (labs/notes/studies)      Orders Placed This Encounter    HEMOGLOBIN A1C WITH EAG    METABOLIC PANEL, BASIC    LIPID PANEL    MICROALBUMIN, UR, RAND W/ MICROALB/CREAT RATIO    TSH 3RD GENERATION    CELIAC ANTIBODY PROFILE    REFERRAL TO DIABETIC EDUCATION     Referral Priority:   Routine     Referral Type:   Consultation     Referral Reason:   Specialty Services Required     Number of Visits Requested:   1    Blood-Glucose Transmitter (Dexcom G6 Transmitter) michelle     Sig: Use as directed     Dispense:  1 Each     Refill:  3    Dexcom G6 Sensor michelle     Sig: Use as directed every 10 days     Dispense:  9 Each     Refill:  3    Blood-Glucose Meter,Continuous (Dexcom G6 ) misc     Sig: Use as directed     Dispense:  1 Each     Refill:  0      Follow-up and Dispositions    · Return in about 6 weeks (around 5/31/2022).

## 2022-04-20 LAB
ALBUMIN/CREAT UR: 183 MG/G CREAT (ref 0–29)
BUN SERPL-MCNC: 12 MG/DL (ref 6–24)
BUN/CREAT SERPL: 17 (ref 9–23)
CALCIUM SERPL-MCNC: 10.1 MG/DL (ref 8.7–10.2)
CHLORIDE SERPL-SCNC: 93 MMOL/L (ref 96–106)
CHOLEST SERPL-MCNC: 263 MG/DL (ref 100–199)
CO2 SERPL-SCNC: 25 MMOL/L (ref 20–29)
CREAT SERPL-MCNC: 0.69 MG/DL (ref 0.57–1)
CREAT UR-MCNC: 62.3 MG/DL
EGFR: 112 ML/MIN/1.73
EST. AVERAGE GLUCOSE BLD GHB EST-MCNC: 209 MG/DL
GLIADIN PEPTIDE IGA SER-ACNC: 6 UNITS (ref 0–19)
GLIADIN PEPTIDE IGG SER-ACNC: 2 UNITS (ref 0–19)
GLUCOSE SERPL-MCNC: 79 MG/DL (ref 65–99)
HBA1C MFR BLD: 8.9 % (ref 4.8–5.6)
HDLC SERPL-MCNC: 145 MG/DL
IGA SERPL-MCNC: 231 MG/DL (ref 87–352)
LDLC SERPL CALC-MCNC: 108 MG/DL (ref 0–99)
MICROALBUMIN UR-MCNC: 114.1 UG/ML
POTASSIUM SERPL-SCNC: 3.9 MMOL/L (ref 3.5–5.2)
SODIUM SERPL-SCNC: 138 MMOL/L (ref 134–144)
TRIGL SERPL-MCNC: 62 MG/DL (ref 0–149)
TSH SERPL DL<=0.005 MIU/L-ACNC: 1.62 UIU/ML (ref 0.45–4.5)
TTG IGA SER-ACNC: <2 U/ML (ref 0–3)
TTG IGG SER-ACNC: <2 U/ML (ref 0–5)
VLDLC SERPL CALC-MCNC: 10 MG/DL (ref 5–40)

## 2022-04-22 RX ORDER — LISINOPRIL 5 MG/1
5 TABLET ORAL DAILY
Qty: 30 TABLET | Refills: 3 | Status: SHIPPED | OUTPATIENT
Start: 2022-04-22 | End: 2022-06-01 | Stop reason: ALTCHOICE

## 2022-04-22 RX ORDER — ATORVASTATIN CALCIUM 10 MG/1
10 TABLET, FILM COATED ORAL DAILY
Qty: 30 TABLET | Refills: 3 | Status: SHIPPED | OUTPATIENT
Start: 2022-04-22 | End: 2022-06-01 | Stop reason: ALTCHOICE

## 2022-05-04 ENCOUNTER — CLINICAL SUPPORT (OUTPATIENT)
Dept: DIABETES SERVICES | Age: 42
End: 2022-05-04
Payer: MEDICAID

## 2022-05-04 DIAGNOSIS — E10.65 TYPE 1 DIABETES MELLITUS WITH HYPERGLYCEMIA (HCC): Primary | ICD-10-CM

## 2022-05-04 PROCEDURE — G0108 DIAB MANAGE TRN  PER INDIV: HCPCS | Performed by: DIETITIAN, REGISTERED

## 2022-05-04 NOTE — PROGRESS NOTES
[address] [place] [address] for Diabetes Health  Diabetes Self-Management Education & Support Program  Pre-program Assessment    Reason for Referral: T1DM  Referral Source: Maggie Bustamante MD  Services requested: DSMES, MNT and Personal CGM training    ASSESSMENT    From my perspective, the participant would benefit from [facility] specifically related to Reducing risks, Healthy eating, Monitoring, Physical activity, Taking medications, Healthy coping and Problem solving. Will adapt DSMES program to build on participant's skills score, confidence score and preparedness score as noted in the Diabetes Skills, Confidence, and Preparedness Index. During the program, we will focus on providing DSMES that specifically addresses participant's interest in Healthy eating, as shown by their reported readiness to change. The participant would be best served by attending weekly individual sessions, as participant shared she has a schedule conflict with the group class. Diabetes Self-Management Education Follow-up Visit: 5/12/22       Clinical Presentation  Lizzie Hatch is a 39 y.o.  female referred for diabetes self-management education. Participant has Type 1 DM for 11-20 years. Family history negative for diabetes. Patient reports not receiving DSMES services in the past. Most recent A1c value:   Lab Results   Component Value Date/Time    Hemoglobin A1c 8.9 (H) 04/19/2022 12:09 PM    Hemoglobin A1c (POC) 7.4 03/03/2020 08:35 AM       Diabetes-related medical history:  Acute complications  Hx of DKA    Diabetes-related medications:  Current dosing:   Key Antihyperglycemic Medications             insulin glargine (LANTUS,BASAGLAR) 100 unit/mL (3 mL) inpn (Basaglar or Lantus, whichever most preferred): INJECT 25 units each morning.     NovoLOG Flexpen U-100 Insulin 100 unit/mL (3 mL) inpn inject 7 units with Breakfast, Lunch and 5 units with Dinner + CORRECTION MAX 40 UNITS PER DAY (MUST SEE NEW ENDO OR PCP FOR MORE REFILLS)          Blood Pressure Management  Key ACE/ARB Medications             lisinopriL (PRINIVIL, ZESTRIL) 5 mg tablet Take 1 Tablet by mouth daily. Lipid Management  Key Antihyperlipidemia Meds             atorvastatin (LIPITOR) 10 mg tablet Take 1 Tablet by mouth daily. Clot Prevention  Key Anti-Platelet Anticoagulant Meds     The patient is on no antiplatelet meds or anticoagulants. Learning Assessment  Learning objectives Educator assessment (5/4/2022)   Diabetes Disease Process  The participant can   A) describe diabetes in basic terms;   B) state the type of diabetes they have; &   C) state accepted blood glucose targets. Healthy Eating  The participant can   A) identify carbohydrate foods; &   B) accurately read food labels. Being Active  The participant can  A) state the benefits of physical activity;  B) report their current PA practices;  C) identify PA they would consider incorporating in their lives; &  D) develop an implementation plan. Monitoring  The participant can  A) operate their blood glucose meter; &  B) describe how they log their blood glucoses to share with their provider. Taking Medications  The participant can  A) name their diabetes medications;  B) state the purpose and dose;  C) note side effects; &  D) describe proper storage, disposal & transport (if appropriate). Healthy Coping  The participant can    A) describe their response to diabetes diagnosis; B) describe their specific coping mechanisms;  C) identify supportive people and/or other resources that positively support their diabetes self-care and health. Reducing Risks  The participant can describe the preventive measures used by providers to promote health and prevent diabetes complications.      Problem Solving  The participant can   A) identify signs, symptoms & treatment of hypoglycemia;   B) identify signs, symptoms & treatment of hyperglycemia;  C) describe their sick day plan; &  D) identify BG patterns to discuss with their provider. No  Yes  Yes        Participant reported potatoes, chips, bread, rice, and maybe corn. No        Yes  Yes  No  No        Yes  Yes          Yes  Yes   Yes   Participant identified storage and disposal, did not identify transport. Yes  Participant identified reading. Participant identified her family. No           Participant identified s/s, did not identify treatment. Yes  Yes  Yes     Characteristics to Learning   Barriers to Learning   [] Cognitive loss  [] Mental retardation   [] Intellectual delay/cognitive impairment  [] Psychiatric disorder  [] Visually impaired  [] Hearing loss                 [] Low literacy (difficulty with written text)  [] Low numeracy (difficulty with mathematical information  [] Low health literacy (difficulty with understanding health information & services  [] Language  [] Functional limitation   [] Pain   [] Financial  [] Transportation  [] None    Other: Work schedule   Favorite Ways to Learn   [] Lecture  [] Slides  [x] Reading [] Video-Internet  [] Cassettes/CDs/MP3's  [] Interactive Small Groups [] Other       Behavioral Assessment  Current self-care practices  Educator assessment (5/4/2022)   Healthy Eating  Current practices  24-hour Dietary Recall:  Breakfast: Eggs, ortega, fried potatoes (1.5 cups)  Lunch: [place] [facility] and ham sandwich, chips (individual bag)  Dinner: Claryce Moron chicken, fried potatoes (1.5 cups), green beans  Beverages: Diet soda, water  Alcohol: None     Would benefit from DSMES related to Healthy Eating: Yes      Eats a carbohydrate controlled diet: Participant's diet recall reveals managed portions of CHO though limited meal planning, and participant expressed interest in focusing on healthy eating with diabetes.          Stage of change: Action   Being Active  Current practices  How many days during the past week have you performed physical activity where your heart beats faster and your breathing is harder than normal for 30 minutes or more?  0 days    How many days in a typical week do you perform activity such as this?   0 days     *Participant shared she includes movement throughout the day by walking around the house and at work. Would benefit from [facility] related to Being Active: Yes      Exercises 150 minutes/week: No      Stage of change: Action     Monitoring  Current practices  Do you monitor your blood sugar? Yes    How often do you monitor? 4-5x/day    What are the range of readings?  mg/dL  Breakfast:  mg/dL (preprandial)  Morning snack:  mg/dL (2-3 hours after breakfast)  Lunch: 150-340 mg/dL (preprandial)  Dinner:  mg/dL (preprandial)    Do you know your last A1c measurement? No    Do you know the meaning of the A1c? No     Would benefit from [facility] related to Monitoring: Yes      Uses BG readings to establish trends and understand BG patterns: Yes      Stage of change: Action   Taking Medication  Current practices  Do you understand what your diabetes medications do? Yes    How often do you miss doses of your diabetes medications? 1-2x/month    Can you afford your diabetes medications? Yes   Would benefit from [facility] related to Taking Medication: Yes      Takes medications consistently to receive full benefit: Yes      Stage of change: Action       Healthy Coping   Current state  Diabetes Skills, Confidence and Preparedness Index: Total score: 3.7  Skills: 2.7  Confidence: 4.3  Preparedness: 4.6   Would benefit from DSMES related to Healthy Coping: Yes      Identifies specific people, organizations,etc, that actively support their diabetes self-care efforts: Yes      Stage of change: Action     Reducing Risks  Current state  Vaccines:  Influenza: There is no immunization history for the selected administration types on file for this patient.     Pneumococcal: There is no immunization history for the selected administration types on file for this patient. Hepatitis: There is no immunization history for the selected administration types on file for this patient. Examinations:  Dilated eye exam: Last appointment was: within the past year per participant report. Dental exam: Last appointment was: 3 years ago per participant report. Foot exam: Last appointment was: 3/03/2020 per documentation    Heart Protection:  BP Readings from Last 2 Encounters:   04/19/22 138/89   01/25/22 129/86        Lab Results   Component Value Date/Time    LDL, calculated 108 (H) 04/19/2022 12:09 PM    LDL, calculated 86 03/03/2020 10:00 AM        Kidney Protection:  Lab Results   Component Value Date/Time    Microalb/Creat ratio (ug/mg creat.) 183 (H) 04/19/2022 12:09 PM        Would benefit from [facility] related to Reducing Risks: Yes      Actively participates in decision-making with provider regarding secondary prevention:  Participant shared she has completed a recent dilated eye exam, and has completed a recent foot exam per documentation. Stage of change: Action   Problem Solving  Current state  Hypoglycemia Management:  What are signs and symptoms of hypoglycemia that you experience? Shaking/trembling, Trouble concentrating, Sweat/clammy skin, Rapid heartrate    How do you prevent hypoglycemia? Consistent meals/snack times    How do you treat hypoglycemia? Participant shared she has 2 cups of juice, waits 15 minutes, and checks her BG. Hyperglycemia Management:  What are signs and symptoms of hyperglycemia that you experience? Fatigue, Headache    How can you prevent hyperglycemia? Take medication as instructed    Sick Day Management:  What do you do differently on sick days? Check blood glucose every 2-4 hours    Pattern Management:  Do you notice blood glucose patterns when you look at the readings in your meter or logbook? Yes    How do you use the blood glucose readings from your meter or logbook?  Understand how body responds to meals, Understand how body responds to medications and/or insulin     Would benefit from [facility] related to Problem Solving: Yes      Articulates appropriate strategies to address hypoglycemia, hyperglycemia, sick day care and BG pattern: No, Participant identified appropriate strategies for preventing hypoglycemia and hyperglycemia, sick day management, and using blood glucose readings. Stage of change: Action     Note: Content derived from the American Association of Diabetes Educators' Diabetes Education Curriculum: A Guide to Successful Self-Management (3rd edition)      Education provided: Reviewed with participant rule of 15 for treating hypoglycemia, participant verbalized understanding. Salem Holstein, MS, RDN on 5/4/2022 at 11:34 AM    Total minutes: 40 minutes  Encounter date: 5/4/2022     Additional Data for SCPI:  Diabetes Skills, Confidence & Preparedness Index (SCPI) ©  All scales and questions are out of 7. Overall SCPI score: 3.7 Skills Score: 2.7  Low: Healthy Eating(Q1),Blood Sugar Monitoring(Q3),Reducing Risks(Q5),Problem Solving(Q6),Healthy Coping(Q7),Blood Sugar Monitoring(Q8),Reducing Risks(Q9) Confidence Score: 4.3  Low: Healthy Coping(Q2),Reducing Risks(Q3),Problem ROLCLWI(Y9) Preparedness Score: 4.6  Low: Being Active(Q2),Taking Medication(Q5)   Healthy Eating Score: 5.0  Low: Skills(Q1) Taking Medication Score: 3.0  Low: Preparedness(Q5) Blood Sugar Monitoring Score: 4.4  Low: Skills(Q3),Skills(Q8) Reducing Risks Score: 3.0  Low: Skills(Q5),Skills(Q9),Confidence(Q3)   Problem Solving Score: 3.3  Low: Skills(Q6),Confidence(Q7) Healthy Coping Score: 2.7  Low: Skills(Q7),Confidence(Q2) Being Active Score: 4.0  Low: Preparedness(Q2)     Skills/Knowledge Questions   1. I know how to plan meals that have the best balance between carbohydrates, proteins and vegetables. 2   2. I know how my diabetes medications (pills, injectables and/or insulin) work in my body. 4   3.  I know when to check my blood sugar if I want to see how my body responded to a meal. 2   4. I know when to check my blood sugars to determine if my medication or insulin doses are correct. 6   5. I know what to do to prevent a low blood sugar when I exercise (either before, during, or after). 2   6. When I am sick, I know what to do differently with my diabetes management. 2   7. I know how stress can affect my diabetes management. 2   8. When I look at my blood sugars over a given week, I can explain what my blood sugar pattern is. 2   9. I know what my target levels are for A1c, blood pressure and cholesterol. 2   Confidence Questions   1. I am confident that I can plan balanced meals and snacks. 6   2. I am confident that I can manage my stress. 2   3. I am confident that I can prevent a low blood sugar during or after exercise. 2   4. I am confident that the next time I eat out, I will be able to choose foods that best keep my blood sugars in target. 6   5. I am confident I can include exercise into my schedule. 6   6. I am confident that I can use my daily blood sugars to adjust my diet, my activity, and/or my insulin. 6   7. When something out of my normal routine happens, I am confident that I can problem-solve and keep my diabetes on track. 2   Preparedness Questions   1. Within the next month, I will begin to eat more balanced meals and snacks. 6   2. Within the next month, I will choose an exercise activity and I will start fitting it into my schedule. 2   3. Within the next month, I will make a list of stress management options that work for me. 4   4. Within the next month, I will consistently plan ahead to prevent low blood sugars. 6   5. Within the next month, I will start adjusting my insulin doses on my own. 2   6. Within the next month, I will begin making changes to my diabetes management based on my daily blood sugars (eg - eating, activity and/or insulin). 6   7.  Within the next month, I will begin making changes to my diabetes management to meet my overall goals (eg - eating, activity and/or insulin).  6

## 2022-05-18 ENCOUNTER — CLINICAL SUPPORT (OUTPATIENT)
Dept: DIABETES SERVICES | Age: 42
End: 2022-05-18
Payer: MEDICAID

## 2022-05-18 DIAGNOSIS — E10.65 TYPE 1 DIABETES MELLITUS WITH HYPERGLYCEMIA (HCC): Primary | ICD-10-CM

## 2022-05-18 PROCEDURE — G0108 DIAB MANAGE TRN  PER INDIV: HCPCS | Performed by: DIETITIAN, REGISTERED

## 2022-05-18 NOTE — PROGRESS NOTES
[address] [place] [address] for Diabetes Health  Diabetes Self-Management Education & Support Program  Encounter Note    SUMMARY  Diabetes self-care management training was completed related to What is Diabetes and taking medications. The participant will return on June 8 to continue DSMES related to healthy eating. The participant did not identify SMART Goal(s) and will practice knowledge and skills related to taking medications to improve diabetes self-management. EVALUATION:  Participant shared her BGs have been variable in the mornings fasting (78-200s mg/dL) and after dinner (50s-300s mg/dL), and shared she takes her basaglar in the morning as prescribed and takes 5 units of novolog at dinner. Participant shared she notices higher BGs after dinner when she has fried chicken and lower BGs when she has baked chicken. Participant shared she wants to talk with her provider about her BG trends and medication regimen, and shared plans to log her meals, BGs, physical activity, and medication to identify trends. Participant expressed understanding of the expected action and side effects of basaglar and novolog, and hypoglycemia s/s, prevention, and treatment. RECOMMENDATIONS:  Encouraged participant to follow up with provider on BG trends following this visit, utilize rule of 15 for treating hypoglycemia, keep a log of meals, BGs, physical activity, and medication to identify trends    TOPICS DISCUSSED TODAY:  WHAT IS DIABETES? Minutes: [address] [place]? 30 minutes      Next provider visit is scheduled for 6/01/22       DATE DSMES TOPIC EVALUATION     5/18/2022 WHAT IS DIABETES?   a. Role of the normal pancreas in energy balance and blood glucose control   b. The defect seen in diabetes   c. Signs & symptoms of diabetes   d. Diagnosis of diabetes   e. Types of diabetes   f. Blood glucose targets in non-pregnant adults       The participant knows   Their type of diabetes:  Yes   The basic physiologic defect: Yes   Blood glucose targets: Yes     DATE DSMES TOPIC EVALUATION     5/18/2022 HOW DO MY DIABETES MEDICATIONS WORK?   a. Type 1 medications & methods    Insulin injections    Injection sites   c. Hypoglycemia symptoms & treatment    Glucagon emergency kits   d. General guidance regarding insulin use whether Type 1, 2 or gestational diabetes    Storage of insulin    Disposal     Traveling with medications   e. Barriers to medication adherence      The participant    Can describe the expected action & side effects of prescribed diabetes medications: Yes   Can demonstrate injection technique (if applicable): Participant expressed understanding. The participant plans to address: Participant shared she wants to talk with her provider about her BG trends and medication regimen. Agustina Torres MS, RDN on 5/18/2022 at 1:12 PM    I have personally reviewed the health record, including provider notes, laboratory data and current medications before making these care and education recommendations.   Total minutes: 60 minutes

## 2022-06-01 ENCOUNTER — OFFICE VISIT (OUTPATIENT)
Dept: ENDOCRINOLOGY | Age: 42
End: 2022-06-01
Payer: MEDICAID

## 2022-06-01 ENCOUNTER — TELEPHONE (OUTPATIENT)
Dept: ENDOCRINOLOGY | Age: 42
End: 2022-06-01

## 2022-06-01 VITALS
BODY MASS INDEX: 29.19 KG/M2 | HEART RATE: 94 BPM | DIASTOLIC BLOOD PRESSURE: 82 MMHG | WEIGHT: 171 LBS | SYSTOLIC BLOOD PRESSURE: 127 MMHG | HEIGHT: 64 IN

## 2022-06-01 DIAGNOSIS — E10.65 TYPE 1 DIABETES MELLITUS WITH HYPERGLYCEMIA (HCC): Primary | ICD-10-CM

## 2022-06-01 DIAGNOSIS — E78.2 MIXED HYPERLIPIDEMIA: ICD-10-CM

## 2022-06-01 PROCEDURE — 3052F HG A1C>EQUAL 8.0%<EQUAL 9.0%: CPT | Performed by: INTERNAL MEDICINE

## 2022-06-01 PROCEDURE — 99214 OFFICE O/P EST MOD 30 MIN: CPT | Performed by: INTERNAL MEDICINE

## 2022-06-01 NOTE — PROGRESS NOTES
Chief Complaint   Patient presents with    Diabetes       Patient was last seen: 6 weeks ago 4/19/2022     General:   Did not get dexcom - [facility] sent text that waiting on insurance approval  Did use dexcom sample- would show lows and they were not low on finger stick - was not having symptoms  Was scaring her b/c of the lows    Dx DM1 age 22     A1c: last a1c was 8.9     DM Medications:    Lantus 25 units in AM - same time ~8am, rotates injection sites   Novolog 7/7/5 at meals - adjusting for meals when she can (baked foods drop it)  Correction scale of 1:50>150  No insulin with snacks - which is usually fruit- but dexcom showed not an issue -but issue with dexcom showing lows     No interest in pump - but doesn't know much about them -to get into with [facility]    Last Changes: : no recent changes    Sugar Checks: checks more than 4 times a day     AM: reports:  160-170s    PM: reports:  walks a lot - if late lunch can drop     LOWs:  has low sugars < 50s     DIET: eats what they want -not much education - [facility] [employer]    EXERCISE: daily, walks     HTN: at goal, on no meds     LIPIDS: at goal, on no meds    RENAL: has normal renal function     EYES: eye exam in past year, has no retinopathy -    FEET: has no current issues, no numbness or tingling     DENTAL:  overdue for dentist     HEART:  no chest pain, shortness of breath or claudication, has no cardiac history     ASA:  does not take aspirin or other blood thinner     SYMPTOMS: no polyuria, thirst or blurred vision     THYROID: no known thyroid issue    DIABETES HISTORY: see above      LABS/STUDIES:   LABS:    Lab Results   Component Value Date/Time    TSH 1.620 04/19/2022 12:09 PM      Lab Results   Component Value Date/Time    Hemoglobin A1c 8.9 (H) 04/19/2022 12:09 PM    Hemoglobin A1c 9.7 (H) 08/14/2013 06:15 AM    Hemoglobin A1c 10.9 (H) 02/01/2011 05:20 AM    Glucose 79 04/19/2022 12:09 PM    Glucose (POC) 208 (H) 12/04/2015 04:09 PM    GFR est  03/03/2020 10:00 AM    GFR est non- 03/03/2020 10:00 AM    Microalb/Creat ratio (ug/mg creat.) 183 (H) 04/19/2022 12:09 PM    LDL, calculated 108 (H) 04/19/2022 12:09 PM    LDL, calculated 86 03/03/2020 10:00 AM    Creatinine 0.69 04/19/2022 12:09 PM      Lab Results   Component Value Date/Time    Hemoglobin A1c (POC) 7.4 03/03/2020 08:35 AM       Lab Results   Component Value Date/Time    Cholesterol, total 263 (H) 04/19/2022 12:09 PM    Triglyceride 62 04/19/2022 12:09 PM    HDL Cholesterol 145 04/19/2022 12:09 PM    LDL, calculated 108 (H) 04/19/2022 12:09 PM    LDL, calculated 86 03/03/2020 10:00 AM          Past Medical History:   Diagnosis Date    Diabetes ([facility]     type 1; endocrinologist at [facility] Other ill-defined conditions(799.89)     DKA    PNA (pneumonia)     2011; strep pneumococcus    Strep pharyngitis 3/13    with sepsis        Social History     Tobacco Use    Smoking status: Current Every Day Smoker     Packs/day: 0.50     Types: Cigarettes    Smokeless tobacco: Never Used    Tobacco comment: \"its been a long time\"   Substance Use Topics    Alcohol use: Yes     Comment: two times a week \"sometimes a little bit and sometimes a lot\"      Employer:  [employer]      Blood pressure 127/82, pulse 94, height 5' 4\" (1.626 m), weight 171 lb (77.6 kg). Weight Metrics 6/1/2022 4/19/2022 1/25/2022 3/3/2020 5/16/2016 12/4/2015 7/27/2015   Weight 171 lb 171 lb 167 lb 187 lb 156 lb 164 lb 9.6 oz 167 lb 15.9 oz   BMI 29.35 kg/m2 29.35 kg/m2 28.67 kg/m2 32.1 kg/m2 26.76 kg/m2 28.24 kg/m2 29.77 kg/m2        EXAM  Not done today    Assessment/Plan:   1. Type 1 diabetes mellitus with hyperglycemia ([facility]  Still waiting on Dexcom.   Concerns sample one used gave to many false lows  Gave sample libre2 to see if has similar issue or not    she has the following indications to begin treatment with Dexcom:  1) she has type 1 diabetes and is on an intensive insulin regimen with 4 injections per day  2) she tests her blood sugar 4 times per day and makes treatment decisions off her blood sugar readings and will do the same off dexcom sensor readings  3) she will require frequent adjustments to her insulin injection doses based on her dexcom sensor readings  4) she will benefit from therapeutic continuous glucose monitoring and I recommend that she begin this  5) she is seen in my office every 3 months  6) she has frequent low sugars under 50    Continue to work with [employer] [facility]     Reiterated Novolog doses at meals are Baseline doses. Can go up or down on dose depending on carb content and prior response to specific foods, as well as adding more if sugar is high - this was a new concept to her last time and still perplexed by it      No orders of the defined types were placed in this encounter. Follow-up and Dispositions    · Return in about 2 months (around 8/1/2022).

## 2022-06-01 NOTE — TELEPHONE ENCOUNTER
Can you check with Kenyatta to see the hold up on her getting the dexcom continuous glucose monitor ?

## 2022-06-01 NOTE — LETTER
6/1/2022    Patient: Adia Swanson   YOB: 1980   Date of Visit: 6/1/2022     Dean Sullivan NP  Mak Carney [address] [place] [id number]  Via Fax: [phone]    Dear Dean Sullivan NP,      Thank you for referring Ms. Adia Swanson to [facility] for evaluation. My notes for this consultation are attached. If you have questions, please do not hesitate to call me. I look forward to following your patient along with you.       Sincerely,    Kristie Espinal MD

## 2022-06-22 ENCOUNTER — CLINICAL SUPPORT (OUTPATIENT)
Dept: DIABETES SERVICES | Age: 42
End: 2022-06-22
Payer: MEDICAID

## 2022-06-22 DIAGNOSIS — E10.65 TYPE 1 DIABETES MELLITUS WITH HYPERGLYCEMIA (HCC): Primary | ICD-10-CM

## 2022-06-22 PROCEDURE — G0108 DIAB MANAGE TRN  PER INDIV: HCPCS | Performed by: DIETITIAN, REGISTERED

## 2022-06-22 NOTE — PROGRESS NOTES
[employer] [facility]  Diabetes Self-Management Education & Support Program  Encounter Note    SUMMARY  Diabetes self-care management training was completed related to healthy eating and problem solving. he participant will return on July 6 to continue DSMES related to healthy eating. The participant did identify SMART Goal(s) and will practice knowledge and skills related to healthy eating and problem solving to improve diabetes self-management. EVALUATION:  Participant shared she has been experiencing hypoglycemia a few times per week after she eats a smaller meal and on days when she is more active at work, and has also been waking up with BGs in the 200s mg/dL when her BGs the evening before are 120s mg/dL. Participant shared plans to share BG readings with her provider following this visit. Participant shared she has been eating more non-starchy vegetables and using olive oil instead of butter when preparing meals. Participant shared she has fruit a few times per week, and she would like to focus on including fruit with dinner 3 days/week. RECOMMENDATIONS:  Encouraged participant to discuss BG patterns with provider following this visit, continue using rule of 15 for treating hypoglycemia, practice building balanced meals    TOPICS DISCUSSED TODAY:  WHAT CAN I EAT? 30  HOW DO I FIGURE OUT WHAT'S INFLUENCING MY BLOOD GLUCOSES? 30 minutes      Next provider visit is scheduled for: 8/10/22       SMART GOAL(S)  1. Include a serving of fruit with my dinner 3 days/week. (Goal set 6/22/22)  ACHIEVEMENT OF GOAL(S) :  Goal set during visit       DATE DSMES TOPIC EVALUATION     6/22/2022 WHAT CAN I EAT?   a. General principles   c. Nutritional terms & tools    Healthy Plate method    Carbohydrate Counting      The participant    Uses Healthy Plate principles in constructing meals: Yes    The participant plans to address: building balanced meals, including fruit in eating pattern.      DATE DSMES TOPIC EVALUATION     6/22/2022 HOW DO I FIGURE OUT WHAT'S INFLUENCING MY BLOOD GLUCOSES?   b. Common problems in diabetes self-care    Hypoglycemia    Hyperglycemia        The participant has an action plan for    Hypoglycemia: Yes   Hyperglycemia: Yes    The participant plans to address: discussing BG patterns with provider. Sanjiv Pantoja MS, RDN on 6/22/2022 at 12:33 PM    I have personally reviewed the health record, including provider notes, laboratory data and current medications before making these care and education recommendations.   Total minutes: 60 minutes  Encounter date: 6/22/2022

## 2022-07-05 RX ORDER — FLASH GLUCOSE SCANNING READER
EACH MISCELLANEOUS
Qty: 1 EACH | Refills: 0 | Status: SHIPPED | OUTPATIENT
Start: 2022-07-05

## 2022-07-05 NOTE — TELEPHONE ENCOUNTER
Yes the freestyle felix 2          You  Alexandra Avila 12 days ago     [place]      Do you have the FreeStyle Felix 2 or 14 day? I will send in a script for a reader, just need to know which one you have.   Gagan Curiel  Patient Medical Advice Request Pool 13 days ago           The [employer] meter is not compatible with the phone I have

## 2022-07-06 NOTE — TELEPHONE ENCOUNTER
I attempted to call MsOlga Ana Lonniegraciela to let her know that per a fax from [facility] they need 30 days of blood sugar logs but I reached a recording saying \"the person ou are trying to reach can not be reached at this time. \"  Monique Alexis

## 2022-08-05 RX ORDER — BLOOD SUGAR DIAGNOSTIC
STRIP MISCELLANEOUS
Qty: 400 STRIP | Refills: 0 | Status: SHIPPED | OUTPATIENT
Start: 2022-08-05

## 2022-08-05 RX ORDER — FLASH GLUCOSE SENSOR
KIT MISCELLANEOUS
Qty: 2 KIT | Refills: 5 | Status: SHIPPED | OUTPATIENT
Start: 2022-08-05

## 2022-08-29 RX ORDER — INSULIN ASPART 100 [IU]/ML
INJECTION, SOLUTION INTRAVENOUS; SUBCUTANEOUS
Qty: 15 ADJUSTABLE DOSE PRE-FILLED PEN SYRINGE | Refills: 5 | Status: SHIPPED | OUTPATIENT
Start: 2022-08-29

## 2022-09-20 ENCOUNTER — HOSPITAL ENCOUNTER (EMERGENCY)
Age: 42
Discharge: HOME | End: 2022-09-20
Attending: EMERGENCY MEDICINE
Payer: MEDICAID

## 2022-09-20 ENCOUNTER — APPOINTMENT (OUTPATIENT)
Dept: GENERAL RADIOLOGY | Age: 42
End: 2022-09-20
Attending: EMERGENCY MEDICINE
Payer: MEDICAID

## 2022-09-20 ENCOUNTER — APPOINTMENT (OUTPATIENT)
Dept: CT IMAGING | Age: 42
End: 2022-09-20
Attending: EMERGENCY MEDICINE
Payer: MEDICAID

## 2022-09-20 VITALS
TEMPERATURE: 99.8 F | DIASTOLIC BLOOD PRESSURE: 103 MMHG | WEIGHT: 159.17 LBS | SYSTOLIC BLOOD PRESSURE: 165 MMHG | RESPIRATION RATE: 19 BRPM | HEIGHT: 64 IN | OXYGEN SATURATION: 97 % | BODY MASS INDEX: 27.17 KG/M2 | HEART RATE: 103 BPM

## 2022-09-20 DIAGNOSIS — U07.1 COVID: Primary | ICD-10-CM

## 2022-09-20 DIAGNOSIS — K76.89 LIVER NODULE: ICD-10-CM

## 2022-09-20 LAB
ALBUMIN SERPL-MCNC: 3.1 G/DL (ref 3.5–5)
ALBUMIN/GLOB SERPL: 0.8 {RATIO} (ref 1.1–2.2)
ALP SERPL-CCNC: 67 U/L (ref 45–117)
ALT SERPL-CCNC: 39 U/L (ref 12–78)
ANION GAP SERPL CALC-SCNC: 11 MMOL/L (ref 5–15)
APPEARANCE UR: CLEAR
AST SERPL-CCNC: 36 U/L (ref 15–37)
BACTERIA URNS QL MICRO: NEGATIVE /HPF
BASOPHILS # BLD: 0 K/UL (ref 0–0.1)
BASOPHILS NFR BLD: 0 % (ref 0–1)
BILIRUB SERPL-MCNC: 0.3 MG/DL (ref 0.2–1)
BILIRUB UR QL: NEGATIVE
BUN SERPL-MCNC: 10 MG/DL (ref 6–20)
BUN/CREAT SERPL: 14 (ref 12–20)
CALCIUM SERPL-MCNC: 8.7 MG/DL (ref 8.5–10.1)
CHLORIDE SERPL-SCNC: 96 MMOL/L (ref 97–108)
CO2 SERPL-SCNC: 26 MMOL/L (ref 21–32)
COLOR UR: ABNORMAL
CREAT SERPL-MCNC: 0.7 MG/DL (ref 0.55–1.02)
DIFFERENTIAL METHOD BLD: ABNORMAL
EOSINOPHIL # BLD: 0 K/UL (ref 0–0.4)
EOSINOPHIL NFR BLD: 0 % (ref 0–7)
EPITH CASTS URNS QL MICRO: ABNORMAL /LPF
ERYTHROCYTE [DISTWIDTH] IN BLOOD BY AUTOMATED COUNT: 14.1 % (ref 11.5–14.5)
FLUAV RNA SPEC QL NAA+PROBE: NOT DETECTED
FLUBV RNA SPEC QL NAA+PROBE: NOT DETECTED
GLOBULIN SER CALC-MCNC: 4 G/DL (ref 2–4)
GLUCOSE SERPL-MCNC: 196 MG/DL (ref 65–100)
GLUCOSE UR STRIP.AUTO-MCNC: NEGATIVE MG/DL
HCG UR QL: NEGATIVE
HCT VFR BLD AUTO: 34.4 % (ref 35–47)
HGB BLD-MCNC: 11.8 G/DL (ref 11.5–16)
HGB UR QL STRIP: NEGATIVE
IMM GRANULOCYTES # BLD AUTO: 0 K/UL (ref 0–0.04)
IMM GRANULOCYTES NFR BLD AUTO: 0 % (ref 0–0.5)
KETONES UR QL STRIP.AUTO: 80 MG/DL
LACTATE BLD-SCNC: <0.4 MMOL/L (ref 0.4–2)
LEUKOCYTE ESTERASE UR QL STRIP.AUTO: NEGATIVE
LIPASE SERPL-CCNC: 40 U/L (ref 73–393)
LYMPHOCYTES # BLD: 0.4 K/UL (ref 0.8–3.5)
LYMPHOCYTES NFR BLD: 5 % (ref 12–49)
MAGNESIUM SERPL-MCNC: 1.2 MG/DL (ref 1.6–2.4)
MCH RBC QN AUTO: 31.6 PG (ref 26–34)
MCHC RBC AUTO-ENTMCNC: 34.3 G/DL (ref 30–36.5)
MCV RBC AUTO: 92.2 FL (ref 80–99)
MONOCYTES # BLD: 1.3 K/UL (ref 0–1)
MONOCYTES NFR BLD: 19 % (ref 5–13)
NEUTS SEG # BLD: 5.4 K/UL (ref 1.8–8)
NEUTS SEG NFR BLD: 76 % (ref 32–75)
NITRITE UR QL STRIP.AUTO: NEGATIVE
NRBC # BLD: 0 K/UL (ref 0–0.01)
NRBC BLD-RTO: 0 PER 100 WBC
PH UR STRIP: 6 [PH] (ref 5–8)
PLATELET # BLD AUTO: 212 K/UL (ref 150–400)
PMV BLD AUTO: 11.1 FL (ref 8.9–12.9)
POTASSIUM SERPL-SCNC: 3.7 MMOL/L (ref 3.5–5.1)
PROT SERPL-MCNC: 7.1 G/DL (ref 6.4–8.2)
PROT UR STRIP-MCNC: 30 MG/DL
RBC # BLD AUTO: 3.73 M/UL (ref 3.8–5.2)
RBC #/AREA URNS HPF: ABNORMAL /HPF (ref 0–5)
RBC MORPH BLD: ABNORMAL
SARS-COV-2, COV2: DETECTED
SODIUM SERPL-SCNC: 133 MMOL/L (ref 136–145)
SP GR UR REFRACTOMETRY: 1.02
UA: UC IF INDICATED,UAUC: ABNORMAL
UROBILINOGEN UR QL STRIP.AUTO: 1 EU/DL (ref 0.2–1)
WBC # BLD AUTO: 7.1 K/UL (ref 3.6–11)
WBC URNS QL MICRO: ABNORMAL /HPF (ref 0–4)

## 2022-09-20 PROCEDURE — 36415 COLL VENOUS BLD VENIPUNCTURE: CPT

## 2022-09-20 PROCEDURE — 87040 BLOOD CULTURE FOR BACTERIA: CPT

## 2022-09-20 PROCEDURE — 87636 SARSCOV2 & INF A&B AMP PRB: CPT

## 2022-09-20 PROCEDURE — 74011250636 HC RX REV CODE- 250/636: Performed by: EMERGENCY MEDICINE

## 2022-09-20 PROCEDURE — 81025 URINE PREGNANCY TEST: CPT

## 2022-09-20 PROCEDURE — 99285 EMERGENCY DEPT VISIT HI MDM: CPT

## 2022-09-20 PROCEDURE — 83605 ASSAY OF LACTIC ACID: CPT

## 2022-09-20 PROCEDURE — 85025 COMPLETE CBC W/AUTO DIFF WBC: CPT

## 2022-09-20 PROCEDURE — 74011000636 HC RX REV CODE- 636: Performed by: EMERGENCY MEDICINE

## 2022-09-20 PROCEDURE — 83735 ASSAY OF MAGNESIUM: CPT

## 2022-09-20 PROCEDURE — 80053 COMPREHEN METABOLIC PANEL: CPT

## 2022-09-20 PROCEDURE — 74011250637 HC RX REV CODE- 250/637: Performed by: EMERGENCY MEDICINE

## 2022-09-20 PROCEDURE — 74177 CT ABD & PELVIS W/CONTRAST: CPT

## 2022-09-20 PROCEDURE — 96365 THER/PROPH/DIAG IV INF INIT: CPT

## 2022-09-20 PROCEDURE — 71045 X-RAY EXAM CHEST 1 VIEW: CPT

## 2022-09-20 PROCEDURE — 96375 TX/PRO/DX INJ NEW DRUG ADDON: CPT

## 2022-09-20 PROCEDURE — 83690 ASSAY OF LIPASE: CPT

## 2022-09-20 PROCEDURE — 81001 URINALYSIS AUTO W/SCOPE: CPT

## 2022-09-20 RX ORDER — MAGNESIUM SULFATE HEPTAHYDRATE 40 MG/ML
2 INJECTION, SOLUTION INTRAVENOUS
Status: COMPLETED | OUTPATIENT
Start: 2022-09-20 | End: 2022-09-20

## 2022-09-20 RX ORDER — SODIUM CHLORIDE 0.9 % (FLUSH) 0.9 %
5-10 SYRINGE (ML) INJECTION
Status: DISCONTINUED | OUTPATIENT
Start: 2022-09-20 | End: 2022-09-21 | Stop reason: HOSPADM

## 2022-09-20 RX ORDER — ACETAMINOPHEN 500 MG
1000 TABLET ORAL ONCE
Status: COMPLETED | OUTPATIENT
Start: 2022-09-20 | End: 2022-09-20

## 2022-09-20 RX ORDER — ONDANSETRON 4 MG/1
4 TABLET, ORALLY DISINTEGRATING ORAL
Qty: 15 TABLET | Refills: 0 | Status: SHIPPED | OUTPATIENT
Start: 2022-09-20

## 2022-09-20 RX ORDER — ONDANSETRON 2 MG/ML
4 INJECTION INTRAMUSCULAR; INTRAVENOUS
Status: COMPLETED | OUTPATIENT
Start: 2022-09-20 | End: 2022-09-20

## 2022-09-20 RX ORDER — NIRMATRELVIR AND RITONAVIR 150-100 MG
2 KIT ORAL EVERY 12 HOURS
Qty: 1 BOX | Refills: 0 | Status: SHIPPED | OUTPATIENT
Start: 2022-09-20 | End: 2022-09-25

## 2022-09-20 RX ORDER — MORPHINE SULFATE 4 MG/ML
4 INJECTION INTRAVENOUS
Status: COMPLETED | OUTPATIENT
Start: 2022-09-20 | End: 2022-09-20

## 2022-09-20 RX ADMIN — SODIUM CHLORIDE 1000 ML: 9 INJECTION, SOLUTION INTRAVENOUS at 17:08

## 2022-09-20 RX ADMIN — ACETAMINOPHEN 1000 MG: 500 TABLET, FILM COATED ORAL at 17:08

## 2022-09-20 RX ADMIN — MAGNESIUM SULFATE HEPTAHYDRATE 2 G: 40 INJECTION, SOLUTION INTRAVENOUS at 18:19

## 2022-09-20 RX ADMIN — ONDANSETRON 4 MG: 2 INJECTION INTRAMUSCULAR; INTRAVENOUS at 17:08

## 2022-09-20 RX ADMIN — MORPHINE SULFATE 4 MG: 4 INJECTION INTRAVENOUS at 17:08

## 2022-09-20 RX ADMIN — IOPAMIDOL 100 ML: 755 INJECTION, SOLUTION INTRAVENOUS at 20:02

## 2022-09-20 NOTE — Clinical Note
[address] EMERGENCY DEPT  [address] [facility] [address] [phone]    Work/School Note    Date: 9/20/2022     To Whom It May concern:    Romana Crea was evaluated by the following provider(s):  Attending Provider: Henry Arshad MD.   [place] virus is suspected. Per the CDC guidelines we recommend home isolation until the following conditions are all met:    1. At least five days have passed since symptoms first appeared and/or had a close exposure,   2. After home isolation for five days, wearing a mask around others for the next five days,  3. At least 24 have passed since last fever without the use of fever-reducing medications and  4.  Symptoms (eg cough, shortness of breath) have improved      Sincerely,          Caridad Corona RN

## 2022-09-20 NOTE — Clinical Note
[address] EMERGENCY DEPT  [address] [facility] [address] [phone]    Work/School Note    Date: 9/20/2022     To Whom It May concern:    Todd Herman was evaluated by the following provider(s):  Attending Provider: Phyllis Jacobson MD.   [address] [facility] virus is suspected. Per the CDC guidelines we recommend home isolation until the following conditions are all met:    1. At least five days have passed since symptoms first appeared and/or had a close exposure,   2. After home isolation for five days, wearing a mask around others for the next five days,  3. At least 24 have passed since last fever without the use of fever-reducing medications and  4.  Symptoms (eg cough, shortness of breath) have improved      Sincerely,          Stephanie Owens MD

## 2022-09-20 NOTE — Clinical Note
[address] EMERGENCY DEPT  [address] [facility] [address] [phone]    Work/School Note    Date: 9/20/2022     To Whom It May concern:    Lynette Quinones was evaluated by the following provider(s):  Attending Provider: Jeffery Blevins MD.   [address] [facility] [address] virus is suspected. Per the CDC guidelines we recommend home isolation until the following conditions are all met:    1. At least five days have passed since symptoms first appeared and/or had a close exposure,   2. After home isolation for five days, wearing a mask around others for the next five days,  3. At least 24 have passed since last fever without the use of fever-reducing medications and  4.  Symptoms (eg cough, shortness of breath) have improved      Sincerely,          Eros Cruz MD

## 2022-09-20 NOTE — ED NOTES
Bedside and Verbal shift change report given to Yves Pritchard RN (oncoming nurse) by Jennifer Richmond (offgoing nurse). Report included the following information SBAR, Kardex, ED Summary and MAR.

## 2022-09-20 NOTE — ED NOTES
Emergency Department Nursing Plan of Care       The Nursing Plan of Care is developed from the Nursing assessment and Emergency Department Attending provider initial evaluation. The plan of care may be reviewed in the ED Provider note.     The Plan of Care was developed with the following considerations:   Patient / Family readiness to learn indicated by:verbalized understanding  Persons(s) to be included in education: patient  Barriers to Learning/Limitations:No    Signed     Gama Farley RN    9/20/2022   3:51 PM

## 2022-09-20 NOTE — ED TRIAGE NOTES
Pt reports abdominal pain x 2 days. Pt reports N/V. Pt denies fevers or chills. Pt also complaining of body aches. Pt tachycardic during triage. Pt denies CP or SOB.

## 2022-09-21 ENCOUNTER — PATIENT OUTREACH (OUTPATIENT)
Dept: CASE MANAGEMENT | Age: 42
End: 2022-09-21

## 2022-09-21 NOTE — PROGRESS NOTES
Patient resolved from Transition of Care episode on 9/21/22. ACM/CTN was unsuccessful at contacting this patient today. Patient/family was provided the following resources and education related to COVID-19 during the initial call:                         Signs, symptoms and red flags related to COVID-19            CDC exposure and quarantine guidelines            [facility] exposure contact - [phone]            Contact for their local Department of Health                 Patient has not had any additional ED or hospital visits. No further outreach scheduled with this CTN/ACM. Episode of Care resolved. Patient has this CTN/ACM contact information if future needs arise.

## 2022-09-21 NOTE — DISCHARGE INSTRUCTIONS
He will need to follow-up with your primary care doctor to obtain an MRI of your liver as an outpatient to further evaluate the incidental finding on your CT scan.

## 2022-09-25 LAB
BACTERIA SPEC CULT: NORMAL
SERVICE CMNT-IMP: NORMAL

## 2022-09-27 ENCOUNTER — TRANSCRIBE ORDER (OUTPATIENT)
Dept: REGISTRATION | Age: 42
End: 2022-09-27

## 2022-09-27 DIAGNOSIS — Z12.31 OTHER SCREENING MAMMOGRAM: Primary | ICD-10-CM

## 2022-10-02 NOTE — ED PROVIDER NOTES
EMERGENCY DEPARTMENT HISTORY AND PHYSICAL EXAM      Date: 9/20/2022  Patient Name: Yamilka Rodriguez    History of Presenting Illness     Chief Complaint   Patient presents with    Abdominal Pain       History Provided By: Patient    HPI: Yamilka Rodriguez, 43 y.o. female with PMHx as noted below presented emergency department chief complaint of abdominal pain, body aches, nausea vomiting, diarrhea, cough. Onset of symptoms 2 days ago. She describes abdominal pain as moderate to severe, constant sharp pain. Pain is nonradiating. Pt denies any other alleviating or exacerbating factors. Additionally, pt specifically denies any recent fever, chills, headache,  CP, SOB, lightheadedness, dizziness, numbness, weakness, BLE swelling, heart palpitations, urinary sxs,  constipation, melena, hematochezia,     PCP: Jackie Frazier NP    Current Outpatient Medications   Medication Sig Dispense Refill    ondansetron (ZOFRAN ODT) 4 mg disintegrating tablet Take 1 Tablet by mouth every eight (8) hours as needed for Nausea or Vomiting. 15 Tablet 0    NovoLOG Flexpen U-100 Insulin 100 unit/mL (3 mL) inpn inject 7 units with Breakfast, Lunch and 5 units with Dinner + CORRECTION MAX 40 UNITS PER DAY (MUST SEE NEW ENDO OR PCP FOR MORE REFILLS) 15 Adjustable Dose Pre-filled Pen Syringe 5    insulin glargine (LANTUS,BASAGLAR) 100 unit/mL (3 mL) inpn (Basaglar or Lantus, whichever most preferred): INJECT 25 units each morning.  30 mL 3    Contour Next Test Strips strip use four times a day (Patient not taking: Reported on 9/20/2022) 400 Strip 0    flash glucose sensor (FreeStyle Felix 2 Sensor) kit Use to check blood sugars (Patient not taking: Reported on 9/20/2022) 2 Kit 5    flash glucose scanning reader (FreeStyle Felix 2 [place]) misc Use to test blood sugars (Patient not taking: Reported on 9/20/2022) 1 Each 0    Blood-Glucose Transmitter (Dexcom G6 Transmitter) michelle Use as directed (Patient not taking: No sig reported) 1 Each 3 Dexcom G6 Sensor michelle Use as directed every 10 days (Patient not taking: No sig reported) 9 Each 3    Blood-Glucose Meter,Continuous (Dexcom G6 ) misc Use as directed (Patient not taking: No sig reported) 1 Each 0    Insulin Needles, Disposable, (BD Ultra-Fine Mini Pen Needle) 31 gauge x 3/16\" ndle Use 4 times daily with insulin pens (Patient not taking: Reported on 2022) 400 Pen Needle 3    Insulin Syringe-Needle U-100 (BD Insulin Syringe Ult-Fine II) 0.5 mL 31 gauge x 5/16\" syrg Sig:use daily (Patient not taking: Reported on 2022) 100 Syringe 3    Insulin Needles, Disposable, (BD Ultra-Fine Mini Pen Needle) 31 gauge x 3/16\" ndle Use 4 times daily with insulin pens (Patient not taking: Reported on 2022) 400 Pen Needle 1    BD VEO INSULIN SYRINGE UF 1 mL 31 gauge x 15/64\" syrg use 1 SYRINGE to inject MEDICATION subcutaneously twice a day (Patient not taking: Reported on 2022)         Past History     Past Medical History:  Past Medical History:   Diagnosis Date    Diabetes ([facility] [address]     type 1; endocrinologist at [facility]    Other ill-defined conditions(799.89)     DKA    PNA (pneumonia)     ; strep pneumococcus    Strep pharyngitis 3/13    with sepsis       Past Surgical History:  Past Surgical History:   Procedure Laterality Date    HX GYN          HX ORTHOPAEDIC      left foot surgery       Family History:  Family History   Problem Relation Age of Onset    Diabetes Mother     Heart Disease Mother     Cancer Father        Social History:  Social History     Tobacco Use    Smoking status: Every Day     Packs/day: 0.50     Types: Cigarettes    Smokeless tobacco: Never    Tobacco comments:     \"its been a long time\"   Substance Use Topics    Alcohol use: Yes     Comment: two times a week \"sometimes a little bit and sometimes a lot\"    Drug use: No     Types: Prescription     Comment: hx smoking marijuana in her teens       Allergies:  No Known Allergies      Review of Systems   Review of Systems  Constitutional: Negative for fever. Positive chills, and fatigue. HENT: Negative for congestion, sore throat, rhinorrhea, sneezing and neck stiffness   Eyes: Negative for discharge and redness. Respiratory: Negative for  shortness of breath, wheezing   Cardiovascular: Negative for chest pain, palpitations   Gastrointestinal: Positive for nausea, vomiting, abdominal pain, diarrhea. Negative blood in stool. Genitourinary: Negative for dysuria, hematuria, flank pain, decreased urine volume, discharge,   Musculoskeletal: Positive for myalgias. Negative joint pain . Skin: Negative for rash or lesions . Neurological: Negative weakness, light-headedness, numbness and headaches. Physical Exam   Physical Exam    GENERAL: alert and oriented, no acute distress  EYES: PEERL, No injection, discharge or icterus. ENT: Mucous membranes pink and moist.  NECK: Supple  LUNGS: Airway patent. Non-labored respirations. Breath sounds clear with good air entry bilaterally. HEART: Tachycardic, regular rhythm. No peripheral edema  ABDOMEN: Non-distended, diffusely tender without guarding or rebound. SKIN:  warm, dry  MSK/EXTREMITIES: Without swelling, tenderness or deformity, symmetric with normal ROM  NEUROLOGICAL: Alert, oriented      Diagnostic Study Results     Labs -   No results found for this or any previous visit (from the past 12 hour(s)). Radiologic Studies -   XR CHEST PORT   Final Result   No acute cardiopulmonary process. CT ABD PELV W CONT   Final Result   1. Indeterminate, subtle, segment 7 hepatic mass. Focal fatty sparing is   possible. Liver protocol MRI is recommended on an outpatient basis. 2. No acute process. CT Results  (Last 48 hours)      None          CXR Results  (Last 48 hours)      None              Medical Decision Making     ITete MD am the first provider for this patient and am the attending of record for this patient encounter.     I reviewed the vital signs, available nursing notes, past medical history, past surgical history, family history and social history. Vital Signs-Reviewed the patient's vital signs. No data found. Records Reviewed: Nursing Notes and Old Medical Records    Provider Notes (Medical Decision Making): The patient presents with a chief complaint of abdominal pain. Differential diagnosis considered includes acute appendicitis, acute cholecystitis, pancreatitis, gastritis, PUD, diverticulitis, mesenteric ischemia, abdominal aortic aneurysm, bowel obstruction, enteritis, colitis, fecal impaction, volvulus, IBS, inflammatory bowel disease, specific food intolerance, peritonitis, perforated viscous, malignancy, UTI, abscess, and abdominal pain NOS, COVID, pneumonia, bronchitis. Nissa Dana Pelvic source of pain was also considered. All labs and diagnostic studies were reviewed and interpreted by me, no acute findings on CT. Labs revealed for hyperglycemia, hypomagnesemia, was repleted. .  Clinical examination, history, and work-up exclude some of the more serious diagnoses as listed above. Cause of the abdominal pain likely secondary to COVID. Patient notified of incidental finding on CT scan and need for outpatient imaging      Pt is tolerating po. The patient states that their symptoms have improved and he feels much better. There are no other new complaints at this time      There were no concerns for any acute life-threatening or surgical pathology that would warrant admission at this time. Vital signs were within acceptable limits at the time of discharge. Patient was in stable condition and felt to be reliable for outpatient management. There were no lab findings of immediate concern.   The patient was advised to return to the emergency room for acutely worsening pain, persistence of pain, fevers, bloody stools, intractable vomiting or bloody emesis, inability to tolerate food/liquids, syncope, or change in mental status. Otherwise, the patient is encouraged to follow-up with a primary care physician within the week if possible. .  The patient understood the work-up and assessment and had no further questions. The patient was discharged home in stable clinical condition. ED Course:   Initial assessment performed. The patients presenting problems have been discussed, and they are in agreement with the care plan formulated and outlined with them. I have encouraged them to ask questions as they arise throughout their visit. Medications   sodium chloride 0.9 % bolus infusion 1,000 mL (0 mL IntraVENous IV Completed 9/20/22 1810)   ondansetron (ZOFRAN) injection 4 mg (4 mg IntraVENous Given 9/20/22 1708)   morphine injection 4 mg (4 mg IntraVENous Given 9/20/22 1708)   acetaminophen (TYLENOL) tablet 1,000 mg (1,000 mg Oral Given 9/20/22 1708)   magnesium sulfate 2 g/50 ml IVPB (premix or compounded) (0 g IntraVENous IV Completed 9/20/22 1924)   iopamidoL (ISOVUE-370) 76 % injection 100 mL (100 mL IntraVENous Given 9/20/22 2002)         Prmione Maddie March's  results have been reviewed with her. She has been counseled regarding her diagnosis. She verbally conveys understanding and agreement of the signs, symptoms, diagnosis, treatment and prognosis and additionally agrees to follow up as recommended with Dr. Kevon Velasco, NP in 24 - 48 hours. She also agrees with the care-plan and conveys that all of her questions have been answered. I have also put together some discharge instructions for her that include: 1) educational information regarding their diagnosis, 2) how to care for their diagnosis at home, as well a 3) list of reasons why they would want to return to the ED prior to their follow-up appointment, should their condition change. Disposition:  home    PLAN:  1.    Discharge Medication List as of 9/20/2022  9:39 PM        START taking these medications    Details   ondansetron [facility] ODT) 4 mg disintegrating tablet Take 1 Tablet by mouth every eight (8) hours as needed for Nausea or Vomiting., Normal, Disp-15 Tablet, R-0      nirmatrelvir-ritonavir (Paxlovid, EUA,) 150-100 mg DsPk Take 2 Tablets by mouth every twelve (12) hours for 5 days. , Normal, Disp-1 Box, R-0           CONTINUE these medications which have NOT CHANGED    Details   NovoLOG Flexpen U-100 Insulin 100 unit/mL (3 mL) inpn inject 7 units with Breakfast, Lunch and 5 units with Dinner + CORRECTION MAX 40 UNITS PER DAY (MUST SEE NEW ENDO OR PCP FOR MORE REFILLS), Normal, Disp-15 Adjustable Dose Pre-filled Pen Syringe, R-5, DARRELL      insulin glargine (LANTUS,BASAGLAR) 100 unit/mL (3 mL) inpn (Basaglar or Lantus, whichever most preferred): INJECT 25 units each morning., Normal, Disp-30 mL, R-3      Contour Next Test Strips strip use four times a day, Normal, Disp-400 Strip, R-0, DARRELL      flash glucose sensor (FreeStyle Felix 2 Sensor) kit Use to check blood sugars, Normal, Disp-2 Kit, R-5      flash glucose scanning reader (FreeStyle Felix 2 [place]) misc Use to test blood sugars, Normal, Disp-1 Each, R-0      Blood-Glucose Transmitter (Dexcom G6 Transmitter) michelle Use as directed, Normal, Disp-1 Each, R-3      Dexcom G6 Sensor michelle Use as directed every 10 days, Normal, Disp-9 Each, R-3, DARRELL      Blood-Glucose Meter,Continuous (Dexcom G6 ) misc Use as directed, Normal, Disp-1 Each, R-0      !! Insulin Needles, Disposable, (BD Ultra-Fine Mini Pen Needle) 31 gauge x 3/16\" ndle Use 4 times daily with insulin pens, Normal, Disp-400 Pen Needle, R-3      Insulin Syringe-Needle U-100 (BD Insulin Syringe Ult-Fine II) 0.5 mL 31 gauge x 5/16\" syrg Sig:use daily, Normal, Disp-100 Syringe, R-3      !!  Insulin Needles, Disposable, (BD Ultra-Fine Mini Pen Needle) 31 gauge x 3/16\" ndle Use 4 times daily with insulin pens, Normal, Disp-400 Pen Needle, R-1      BD VEO INSULIN SYRINGE UF 1 mL 31 gauge x 15/64\" syrg use 1 SYRINGE to inject MEDICATION subcutaneously twice a day, Historical Med, DARRELL       !! - Potential duplicate medications found. Please discuss with provider. 2.   Follow-up Information       Follow up With Specialties Details Why Contact Info    eLslie Salomon NP Nurse Practitioner Schedule an appointment as soon as possible for a visit in 2 days  [phone] EMERGENCY DEPT Emergency Medicine  If symptoms worsen [place]  [phone]          Return to ED if worse     Diagnosis     Clinical Impression:   1. COVID    2. Liver nodule    3. Elevated blood pressure    Please note that this dictation was completed with Dragon, computer voice recognition software. Quite often unanticipated grammatical, syntax, homophones, and other interpretive errors are inadvertently transcribed by the computer software. Please disregard these errors. Additionally, please excuse any errors that have escaped final proofreading.

## 2022-11-21 ENCOUNTER — HOSPITAL ENCOUNTER (EMERGENCY)
Age: 42
Discharge: HOME | End: 2022-11-21
Attending: EMERGENCY MEDICINE
Payer: MEDICAID

## 2022-11-21 VITALS
TEMPERATURE: 98.3 F | DIASTOLIC BLOOD PRESSURE: 89 MMHG | SYSTOLIC BLOOD PRESSURE: 157 MMHG | WEIGHT: 163 LBS | HEIGHT: 65 IN | RESPIRATION RATE: 16 BRPM | HEART RATE: 101 BPM | BODY MASS INDEX: 27.16 KG/M2 | OXYGEN SATURATION: 100 %

## 2022-11-21 DIAGNOSIS — N30.00 ACUTE CYSTITIS WITHOUT HEMATURIA: Primary | ICD-10-CM

## 2022-11-21 DIAGNOSIS — R81 GLUCOSURIA: ICD-10-CM

## 2022-11-21 LAB
APPEARANCE UR: ABNORMAL
BACTERIA URNS QL MICRO: ABNORMAL /HPF
BILIRUB UR QL: NEGATIVE
COLOR UR: ABNORMAL
EPITH CASTS URNS QL MICRO: ABNORMAL /LPF
GLUCOSE BLD STRIP.AUTO-MCNC: 259 MG/DL (ref 65–117)
GLUCOSE UR STRIP.AUTO-MCNC: >1000 MG/DL
HCG UR QL: NEGATIVE
HGB UR QL STRIP: ABNORMAL
KETONES UR QL STRIP.AUTO: 40 MG/DL
LEUKOCYTE ESTERASE UR QL STRIP.AUTO: ABNORMAL
NITRITE UR QL STRIP.AUTO: NEGATIVE
PH UR STRIP: 6 [PH] (ref 5–8)
PROT UR STRIP-MCNC: 100 MG/DL
RBC #/AREA URNS HPF: ABNORMAL /HPF (ref 0–5)
SERVICE CMNT-IMP: ABNORMAL
SP GR UR REFRACTOMETRY: 1.02
UA: UC IF INDICATED,UAUC: ABNORMAL
UROBILINOGEN UR QL STRIP.AUTO: 1 EU/DL (ref 0.2–1)
WBC URNS QL MICRO: >100 /HPF (ref 0–4)

## 2022-11-21 PROCEDURE — 99283 EMERGENCY DEPT VISIT LOW MDM: CPT

## 2022-11-21 PROCEDURE — 82962 GLUCOSE BLOOD TEST: CPT

## 2022-11-21 PROCEDURE — 87186 SC STD MICRODIL/AGAR DIL: CPT

## 2022-11-21 PROCEDURE — 87086 URINE CULTURE/COLONY COUNT: CPT

## 2022-11-21 PROCEDURE — 81001 URINALYSIS AUTO W/SCOPE: CPT

## 2022-11-21 PROCEDURE — 87077 CULTURE AEROBIC IDENTIFY: CPT

## 2022-11-21 PROCEDURE — 81025 URINE PREGNANCY TEST: CPT

## 2022-11-21 RX ORDER — CEPHALEXIN 500 MG/1
500 CAPSULE ORAL 2 TIMES DAILY
Qty: 14 CAPSULE | Refills: 0 | Status: SHIPPED | OUTPATIENT
Start: 2022-11-21 | End: 2022-11-28

## 2022-11-21 RX ORDER — PHENAZOPYRIDINE HYDROCHLORIDE 200 MG/1
200 TABLET, FILM COATED ORAL 3 TIMES DAILY
Qty: 6 TABLET | Refills: 0 | Status: SHIPPED | OUTPATIENT
Start: 2022-11-21 | End: 2022-11-23

## 2022-11-21 NOTE — ED PROVIDER NOTES
EMERGENCY DEPARTMENT HISTORY AND PHYSICAL EXAM          Date: 11/21/2022  Patient Name: Ana Crowley    History of Presenting Illness     Chief Complaint   Patient presents with    Urinary Pain         History Provided By: Patient      HPI: Ana Crowley is a 43 y.o. female with a PMH of diabetes who presents with urinary pain x 3 days. She reports dysuria, increased urinary frequency, cloudy urine, urinary urgency, and strong odor x 3 days. She has diabetes and reports sugar is 333 as most recently checked by her home meter. She is on insulin but reports she is overdue for appointment with endocrinologist. LMP about one month ago. Reports she is about to start her menstrual cycle and has some intermittent pelvic cramping that she usually has prior to menstrual cycle. She denies fever, chills, hematuria, vaginal pain, vaginal discharge, genital sores, abdominal pain, diarrhea, constipation, rectal bleeding. She denies new recent sexual contact or known exposure to STDs. PCP: Andrew Williamson NP    Current Outpatient Medications   Medication Sig Dispense Refill    cephALEXin (Keflex) 500 mg capsule Take 1 Capsule by mouth two (2) times a day for 7 days. 14 Capsule 0    ondansetron (ZOFRAN ODT) 4 mg disintegrating tablet Take 1 Tablet by mouth every eight (8) hours as needed for Nausea or Vomiting.  15 Tablet 0    NovoLOG Flexpen U-100 Insulin 100 unit/mL (3 mL) inpn inject 7 units with Breakfast, Lunch and 5 units with Dinner + CORRECTION MAX 40 UNITS PER DAY (MUST SEE NEW ENDO OR PCP FOR MORE REFILLS) 15 Adjustable Dose Pre-filled Pen Syringe 5    Contour Next Test Strips strip use four times a day (Patient not taking: Reported on 9/20/2022) 400 Strip 0    flash glucose sensor (FreeStyle Felix 2 Sensor) kit Use to check blood sugars (Patient not taking: Reported on 9/20/2022) 2 Kit 5    flash glucose scanning reader (FreeStyle Felix 2 [place]) misc Use to test blood sugars (Patient not taking: Reported on 2022) 1 Each 0    Blood-Glucose Transmitter (Dexcom G6 Transmitter) michelle Use as directed (Patient not taking: No sig reported) 1 Each 3    Dexcom G6 Sensor michelle Use as directed every 10 days (Patient not taking: No sig reported) 9 Each 3    Blood-Glucose Meter,Continuous (Dexcom G6 ) misc Use as directed (Patient not taking: No sig reported) 1 Each 0    insulin glargine (LANTUS,BASAGLAR) 100 unit/mL (3 mL) inpn (Basaglar or Lantus, whichever most preferred): INJECT 25 units each morning.  30 mL 3    Insulin Needles, Disposable, (BD Ultra-Fine Mini Pen Needle) 31 gauge x 3/16\" ndle Use 4 times daily with insulin pens (Patient not taking: Reported on 2022) 400 Pen Needle 3    Insulin Syringe-Needle U-100 (BD Insulin Syringe Ult-Fine II) 0.5 mL 31 gauge x 5/16\" syrg Sig:use daily (Patient not taking: Reported on 2022) 100 Syringe 3    Insulin Needles, Disposable, (BD Ultra-Fine Mini Pen Needle) 31 gauge x 3/16\" ndle Use 4 times daily with insulin pens (Patient not taking: Reported on 2022) 400 Pen Needle 1    BD VEO INSULIN SYRINGE UF 1 mL 31 gauge x 15/64\" syrg use 1 SYRINGE to inject MEDICATION subcutaneously twice a day (Patient not taking: Reported on 2022)         Past History     Past Medical History:  Past Medical History:   Diagnosis Date    Diabetes ([address]     type 1; endocrinologist at [facility]    Other ill-defined conditions(799.89)     DKA    PNA (pneumonia)     ; strep pneumococcus    Strep pharyngitis 3/13    with sepsis       Past Surgical History:  Past Surgical History:   Procedure Laterality Date    HX GYN          HX ORTHOPAEDIC      left foot surgery       Family History:  Family History   Problem Relation Age of Onset    Diabetes Mother     Heart Disease Mother     Cancer Father        Social History:  Social History     Tobacco Use    Smoking status: Every Day     Packs/day: 0.50     Types: Cigarettes    Smokeless tobacco: Never    Tobacco comments:     \"its been a long time\"   Substance Use Topics    Alcohol use: Yes     Comment: two times a week \"sometimes a little bit and sometimes a lot\"    Drug use: No     Types: Prescription     Comment: hx smoking marijuana in her teens       Allergies:  No Known Allergies      Review of Systems   Review of Systems   Constitutional:  Negative for chills, diaphoresis, fatigue and fever. HENT:  Negative for congestion, ear discharge, ear pain, sinus pressure, sinus pain and sore throat. Eyes:  Negative for pain and visual disturbance. Respiratory:  Negative for cough and shortness of breath. Cardiovascular:  Negative for chest pain. Gastrointestinal:  Negative for abdominal pain, blood in stool, constipation, diarrhea, nausea, rectal pain and vomiting. Genitourinary:  Positive for dysuria, frequency, pelvic pain and urgency. Negative for flank pain, genital sores, hematuria, menstrual problem, vaginal bleeding, vaginal discharge and vaginal pain. Musculoskeletal:  Negative for arthralgias, gait problem, myalgias and neck pain. Skin:  Negative for rash. Neurological:  Negative for dizziness, syncope, light-headedness, numbness and headaches. Psychiatric/Behavioral:  Negative for confusion. Physical Exam     Vitals:    11/21/22 1513   BP: (!) 157/89   Pulse: (!) 101   Resp: 16   Temp: 98.3 °F (36.8 °C)   SpO2: 100%   Weight: 73.9 kg (163 lb)   Height: 5' 5\" (1.651 m)     Physical Exam  Vitals and nursing note reviewed. Constitutional:       General: She is not in acute distress. Appearance: She is well-developed. HENT:      Head: Normocephalic and atraumatic. Eyes:      Conjunctiva/sclera: Conjunctivae normal.   Cardiovascular:      Rate and Rhythm: Normal rate and regular rhythm. Heart sounds: Normal heart sounds. Pulmonary:      Effort: Pulmonary effort is normal. No respiratory distress. Breath sounds: Normal breath sounds. No wheezing or rales.    Abdominal:      General: Bowel sounds are normal. There is no distension. Palpations: Abdomen is soft. Tenderness: There is no abdominal tenderness. There is no guarding or rebound. Skin:     General: Skin is warm and dry. Neurological:      Mental Status: She is alert and oriented to person, place, and time. Psychiatric:         Behavior: Behavior normal.         Thought Content: Thought content normal.         Judgment: Judgment normal.             Medical Decision Making   I am the first provider for this patient. I reviewed the vital signs, available nursing notes, past medical history, past surgical history, family history and social history. Vital Signs-Reviewed the patient's vital signs. Records Reviewed: Nursing Notes    Provider Notes (Medical Decision Making):   Patient presents to ED with UTI symptoms x 3 days. Patient is a diabetic and reports elevated sugars. UA shows evidence of infection. Will prescribe Keflex and pyridium. Also shows large amount of glucose and ketones. POC glucose 259. Patient reports being off [place] for a few weeks due to running out, was recently restarted 2 weeks ago. Recommended continue follow up with endo as scheduled for further management of DM. She can return to the ED for deterioration. Pt seen and note written by Foster MURPHY, in conjunction with this provider. Valentino Mclain PA-C        Procedures:  Procedures    Diagnostic Study Results     Labs -     No results found for this or any previous visit (from the past 12 hour(s)). Radiologic Studies -   No orders to display     CT Results  (Last 48 hours)      None          CXR Results  (Last 48 hours)      None                Disposition:  discharge    DISCHARGE NOTE:   4:20 PM        Care plan outlined and precautions discussed. Patient has no new complaints, changes, or physical findings. Results of UA were reviewed with the patient.  All medications were reviewed with the patient; will d/c home with Keflex. All of pt's questions and concerns were addressed. Patient was instructed and agrees to follow up with PCP and endo, as well as to return to the ED upon further deterioration. Patient is ready to go home. Follow-up Information       Follow up With Specialties Details Why Contact Info    Rebeca Carlson NP Nurse Practitioner On 11/28/2022 As needed for new or worsening urinary symptoms [address] [facility] [address] EMERGENCY DEPT Emergency Medicine Follow up As needed [address]    Adela Baptiste MD Endocrinology Physician, Internal Medicine Physician Follow up as scheduled for diabetes follow up [address]  [phone]              Discharge Medication List as of 11/21/2022  4:18 PM        START taking these medications    Details   cephALEXin (Keflex) 500 mg capsule Take 1 Capsule by mouth two (2) times a day for 7 days. , Normal, Disp-14 Capsule, R-0      phenazopyridine (Pyridium) 200 mg tablet Take 1 Tablet by mouth three (3) times daily for 2 days. , Normal, Disp-6 Tablet, R-0           CONTINUE these medications which have NOT CHANGED    Details   ondansetron (ZOFRAN ODT) 4 mg disintegrating tablet Take 1 Tablet by mouth every eight (8) hours as needed for Nausea or Vomiting., Normal, Disp-15 Tablet, R-0      NovoLOG Flexpen U-100 Insulin 100 unit/mL (3 mL) inpn inject 7 units with Breakfast, Lunch and 5 units with Dinner + CORRECTION MAX 40 UNITS PER DAY (MUST SEE NEW ENDO OR PCP FOR MORE REFILLS), Normal, Disp-15 Adjustable Dose Pre-filled Pen Syringe, R-5, DARRELL      Contour Next Test Strips strip use four times a day, Normal, Disp-400 Strip, R-0, DARRELL      flash glucose sensor (FreeStyle Felix 2 Sensor) kit Use to check blood sugars, Normal, Disp-2 Kit, R-5      flash glucose scanning reader (FreeStyle Felix 2 [place]) misc Use to test blood sugars, Normal, Disp-1 Each, R-0 Blood-Glucose Transmitter (Dexcom G6 Transmitter) michelle Use as directed, Normal, Disp-1 Each, R-3      Dexcom G6 Sensor michelle Use as directed every 10 days, Normal, Disp-9 Each, R-3, DARRELL      Blood-Glucose Meter,Continuous (Dexcom G6 ) misc Use as directed, Normal, Disp-1 Each, R-0      insulin glargine (LANTUS,BASAGLAR) 100 unit/mL (3 mL) inpn (Basaglar or Lantus, whichever most preferred): INJECT 25 units each morning., Normal, Disp-30 mL, R-3      !! Insulin Needles, Disposable, (BD Ultra-Fine Mini Pen Needle) 31 gauge x 3/16\" ndle Use 4 times daily with insulin pens, Normal, Disp-400 Pen Needle, R-3      Insulin Syringe-Needle U-100 (BD Insulin Syringe Ult-Fine II) 0.5 mL 31 gauge x 5/16\" syrg Sig:use daily, Normal, Disp-100 Syringe, R-3      !! Insulin Needles, Disposable, (BD Ultra-Fine Mini Pen Needle) 31 gauge x 3/16\" ndle Use 4 times daily with insulin pens, Normal, Disp-400 Pen Needle, R-1      BD VEO INSULIN SYRINGE UF 1 mL 31 gauge x 15/64\" syrg use 1 SYRINGE to inject MEDICATION subcutaneously twice a day, Historical Med, DARRELL       !! - Potential duplicate medications found. Please discuss with provider. Please note that this dictation was completed with Dragon, computer voice recognition software. Quite often unanticipated grammatical, syntax, homophones, and other interpretive errors are inadvertently transcribed by the computer software. Please disregard these errors. Additionally, please excuse any errors that have escaped final proofreading. Diagnosis     Clinical Impression:   1. Acute cystitis without hematuria    2.  Glucosuria

## 2022-11-21 NOTE — ED NOTES
Emergency Department Nursing Plan of Care       The Nursing Plan of Care is developed from the Nursing assessment and Emergency Department Attending provider initial evaluation. The plan of care may be reviewed in the ED Provider note.     The Plan of Care was developed with the following considerations:   Patient / Family readiness to learn indicated by:verbalized understanding  Persons(s) to be included in education: patient  Barriers to Learning/Limitations:No    [address] [facility] EDNA Guo    11/21/2022   3:46 PM

## 2022-11-21 NOTE — LETTER
NOTIFICATION RETURN TO WORK / SCHOOL    11/21/2022 4:37 PM    Ms. Ana Crowley  [address] [address]      To Whom It May Concern:    Ana Crowley is currently under the care of [facility] EMERGENCY DEPT. She will return to work/school on: 11/22/22    Ana Crowley may return to work/school with the following restrictions: N/A      If there are questions or concerns please have the patient contact our office.         Sincerely,      Haris Banegas RN

## 2022-11-21 NOTE — ED TRIAGE NOTES
Pt arrives to the ED AAOX4 with a c/c of dysuria and suprapubic pain onset 3 days ago, denies any n/v/d. Pt additionally reports having vaginal discharge, states she is Diabetic and denies any concern for STDs. Pt is noted in stable condition and in no acute distress.

## 2022-11-24 LAB
BACTERIA SPEC CULT: ABNORMAL
CC UR VC: ABNORMAL
SERVICE CMNT-IMP: ABNORMAL

## 2022-11-30 ENCOUNTER — DOCUMENTATION ONLY (OUTPATIENT)
Dept: HEMATOLOGY | Age: 42
End: 2022-11-30

## 2022-12-30 ENCOUNTER — OFFICE VISIT (OUTPATIENT)
Dept: HEMATOLOGY | Age: 42
End: 2022-12-30
Payer: MEDICAID

## 2022-12-30 VITALS
HEART RATE: 93 BPM | DIASTOLIC BLOOD PRESSURE: 83 MMHG | BODY MASS INDEX: 27.49 KG/M2 | RESPIRATION RATE: 17 BRPM | TEMPERATURE: 97.9 F | SYSTOLIC BLOOD PRESSURE: 129 MMHG | HEIGHT: 65 IN | WEIGHT: 165 LBS | OXYGEN SATURATION: 98 %

## 2022-12-30 DIAGNOSIS — R16.0 LIVER MASS: Primary | ICD-10-CM

## 2022-12-30 PROBLEM — E10.9 TYPE 1 DIABETES MELLITUS WITHOUT COMPLICATION (HCC): Status: ACTIVE | Noted: 2022-12-30

## 2022-12-30 PROBLEM — I10 ESSENTIAL HYPERTENSION: Status: ACTIVE | Noted: 2022-12-30

## 2022-12-30 PROBLEM — K76.89 LIVER NODULE: Status: ACTIVE | Noted: 2022-12-30

## 2022-12-30 NOTE — PROGRESS NOTES
Identified pt with two pt identifiers(name and ). Reviewed record in preparation for visit and have obtained necessary documentation. Chief Complaint   Patient presents with    New Patient    Other     Liver mass, establish care      Vitals:    22 1313   BP: 129/83   Pulse: 93   Resp: 17   Temp: 97.9 °F (36.6 °C)   TempSrc: Temporal   SpO2: 98%   Weight: 165 lb (74.8 kg)   Height: 5' 5\" (1.651 m)   PainSc:   0 - No pain   LMP: 2022       Health Maintenance Review: Patient reminded of \"due or due soon\" health maintenance. I have asked the patient to contact his/her primary care provider (PCP) for follow-up on his/her health maintenance. Coordination of Care Questionnaire:  :   1) Have you been to an emergency room, urgent care, or hospitalized since your last visit? If yes, where when, and reason for visit? no       2. Have seen or consulted any other health care provider since your last visit? If yes, where when, and reason for visit? NO      Patient is accompanied by self I have received verbal consent from Magaly Felipe to discuss any/all medical information while they are present in the room.

## 2022-12-30 NOTE — PROGRESS NOTES
[facility], MD, FACP, [address] Margaret [place]en, [place]      Brandon Lee, PA-C    April S Emeterio, AGPCNP-BC   James Ames, [facility]-   Mary Carmen Tidwell, FNP-BARBRA Bush, FNP-C   All Hernandez, AGPCNP-BC      [address]   at [address], [address] [place] [address] W[address]  22.   [phone]   FAX: [address]   at [address], [address]   [phone]   FAX: [phone]       Patient Care Team:  Jackie Frazier NP as PCP - General (Nurse Practitioner)  Klarissa Henry MD as Physician (Endocrinology Physician)      Problem List  Date Reviewed: 3/3/2020            Codes Class Noted    Essential hypertension ICD-10-CM: I10  ICD-9-CM: 401.9  12/30/2022        Liver nodule ICD-10-CM: K76.89  ICD-9-CM: 573.8  12/30/2022        Type 1 diabetes mellitus without complication ([facility] PGL-27-KV: E10.9  ICD-9-CM: 250.01  12/30/2022        Hypotension, unspecified ICD-10-CM: I95.9  ICD-9-CM: 458.9  8/14/2013        Sepsis, unspecified ICD-10-CM: A41.9  ICD-9-CM: 995.91  3/16/2013        Hyperkalemia ICD-10-CM: E87.5  ICD-9-CM: 276.7  1/30/2011        Acute renal failure ([facility] ICD-10-CM: N17.9  ICD-9-CM: 584.9  1/30/2011        Dehydration ICD-10-CM: E86.0  ICD-9-CM: 276.51  1/30/2011        Hypokalemia ICD-10-CM: E87.6  ICD-9-CM: 276.8  1/30/2011        Elevated LFT's ICD-9-CM: 790.6  1/30/2011        Pneumonia, organism unspecified-strep pneumo in sputum ICD-10-CM: J18.9  ICD-9-CM: 623  1/30/2011        DKA (diabetic ketoacidosis) ([facility] [address] ICD-10-CM: E11.10  ICD-9-CM: 250.12  1/26/2011       COVID 2-3 months, CT   The clinicians listed above have asked me to see Yamilka Rodriguez in consultation regarding a liver mass.       Mom: DM II  Dad: Cancer, unknown  Ciggarettes: 1/2 ppd ~20 years  EtOH: can't quantiy  Single, 2 kids       All medical records sent by the referring physicians were reviewed including imaging studies   and pathology. The patient is a 43 y.o. Black female without any history of previous liver disease. The patient underwent a CT scan   a MRI   for evaluation of   non-specific abdominal pain   right upper quadrant abdominal pain   surveillance for liver cancer   in ***/***. This demonstrated a   single mass measuring *** cm   multiple masses measuring ***-*** cms   in the   right   and   left   lobe of the liver. The patient had been taking   anabolic steroids   high doses of health food supplements   estrogen and/or progesterone   for   birth control   menopausal symptoms   body building   since ***/***. These   medications   supplements   were stopped ***   weeks ago. months ago. years ago. The patient is known to have an extrahepatic malignancy. The following cancer makers were performed;   AFP  CA 19-9  CEA  CA-125  PSA    AFP was   All cancer markers were   normal   elevated   not performed or the results and not available. Imaging studies of the liver   are not available for review   suggest   a normal liver   chronic liver disease   cirrhosis   in addition to the   mass. cyst.      A biopsy of the liver mass was performed in ***/***. The results of this biopsy   are not available at this time   demonstrated   no fibrosis   portal fibrosis   bridging fibrosis   cirrhosis   and   that the lesion was a   focal nodular hyperplasia   hemangioma   adenoma   metastatic cancer from a ***. The patient   does not have any symptoms which could be attributed to the liver disorder.   has the following symptoms which could be attributed to the liver disorder:    fatigue,   fevers,   chills,   shortness of breath,   chest pain,   pain in the right side over the liver,   diffuse abdominal pain,   nausea,   vomiting, constipation,   diarrhea,   dry eyes,   dry mouth,   arthralgias,   myalgias,   yellowing of the eyes or skin,   itching,   dark urine,   problems concentrating,   swelling of the abdomen,   swelling of the lower extremities,   hematemesis,   hematochezia. The patient is not experiencing the following symptoms which are commonly seen in this liver disorder:   fatigue,   fevers,   chills,   shortness of breath,   chest pain,   pain in the right side over the liver,   diffuse abdominal pain,   nausea,   vomiting,   constipation,   diarrhea,   dry eyes,   dry mouth,   arthralgias,   myalgias,   yellowing of the eyes or skin,   itching,   dark urine,   problems concentrating,   swelling of the abdomen,   swelling of the lower extremities,   hematemesis,   hematochezia. The patient   completes all daily activities without any functional limitations. has   Mild   Moderate   Severe   limitations in functional activities which can be attributed to   the liver disease   and   to other medical problems that are not related to the liver disease. ASSESSMENT AND PLAN:  Liver mass   This is   of unknown etiology. most likely to be   benign in the absence of chronic liver disease. a metastasis to the liver. hepatocellular carcinoma. Focal Nodular Hyperplasia. Hepatic adenoma. Hemangioma. Nodular regenerative hyperplasia. The mass is atypical and concerning for possible malignancy. Will schedule for needle biopsy of the mass under   ultrasound   CT   guidance. Hemangiomas   Focal Nodular Hyperplasia  are benign and in general do not increase in size over time. These lesions are not responsive to estrogens or progesterones. HRT or OCH do not need to be stopped if the patient is taking these medications or these medications can be started if necessary. Repeated imaging to monitor the size of these lesions is not necessary   once  now that  the diagnosis has been confirmed.     Hepatic adenoma is a pre-malignant lesion. The risk of malignancy is increased only if the adenoma is greater than 5 cm. Adenomas are sensitive to estrogens and progestins. HRT or OCH should not be given to the patient or stopped if the patient is already taking these medications. An alternative form of contraception should be utilized. Stopping hormonal therapy will lead to the lesion shrinking in size. If the lesion reduces in size to less than 4 cm it can safely be monitored and does not need to be surgically resected. The adenoma is *** cm in diameter,   is unlikely to shrink below 5 cm and should be surgically removed. is highly likely to shrink below 5 cm and can be monitored with repeat imaging. Sometime it is necessary to follow liver masses with dynamic MRI and/or triple phase CT serially until it becomes clear what the mass this is. Biopsy is rarely necessary and should be avoided if possible because all 3 of these lesions are vascular and and there is an increased risk of bleeding with biopsy. The most common reason for small changes in the reported size of lese lesions is that they were measured with different techniques, IV contrast vs no contrast, the scans were performed on different scanners, and interpreted by different radiologists. The patient is   asymptomatic   has non-specific RUQ pain which   has severe RUQ pain which   may be   is unlikely to be   secondary to the liver mass. Laboratory studies   Elastography,   Fibroscan   and imaging suggest the patient has   no evidence of liver disease. chronic liver disease. cirrhosis. an extrahepatic malignancy. Will perform   Have performed   laboratory testing to monitor liver function and degree of liver injury. This included   BMP,   hepatic panel,   CBC with platelet count,   INR.         Will measure the following serologic cancer markers   AFP  CA 19-9  CEA    PSA    Serologic testing for causes of chronic liver disease   was ordered. was negative for     was positive for   HCV  HBV   BELINDA   ASMA  AMA  Alpha-1-antitrypsin  Ceruloplasmin  an elevation in   Ferritin  FE saturation  Will perform additional serologic tests to screen for other causes of chronic liver disease. Will perform imaging of the liver with   ultrasound. triple phase CT scan. dynamic MRI. MRI and MRCP because of persistent elevation in ALP and possible bile duct disease. Treatment of other medical problems in patients with chronic liver disease  There are no contraindications for the patient to take most medications that are necessary for treatment of other medical issues. The patient has cirrhrosis and should avoid taking NSAIDs which are associated with a higher rate of developing CARI. The patient had HE and should avoid taking Benzodiazapines which could exacerbate HE. The patient can take   any medications utilized for treatment of DM  statins to treat hypercholesterolemia    The patient has alcohol induced liver disease but has been abstinent from alcohol for greater than 6 months. Normal doses of acetaminophen, as recommended on the label of the bottle, are not hepatotoxic except in the setting of daily alcohol use, even in patients with cirrhosis and can be utilized for pain. The patient consumes alcohol on a daily basis or has recently stopped consuming alcohol. Regular alcohol use increases the risk of toxicity from acetaminophen. This analgesic should be avoided until the patient has been abstinent from alcohol for 6 months. Counseling for alcohol in patients with chronic liver disease  The patient was counseled regarding alcohol consumption and the effect of alcohol on chronic liver disease. The patient has cirrhosis and was advised to be abstinent from all alcohol including non-alcoholic beer which does contain some alcohol.     The patient does not consume any significant amount of alcohol. The patient has not consumed alcohol since ***. The patient has continued to consume alcohol   daily. on rare social occasions. The patient was reminded that alcohol can cause fatty liver. Patients who have undergone obesity surgery are at much greater risk to develop alcohol induced liver injury. The patient does not have a chronic liver disease and does not have to be abstinent from alcohol. The patient consumes too much alcohol and is at risk to develop alcohol induced liver liver injury. It was recommended that all alcohol consumption be stopped and the patient be abstinent from alcohol for at least *** months. If the patient cannot stop consuming alcohol then there is an aclohol use disorder and the patient should consider entering alcohol counseling and/or attending AA. The patient has an alcohol abuse disorder and it was suggested that they enter alcohol counseling and/or attend AA. If the patient cannot stop drinking alcohol they cannot be considered a candidate for liver transplant. The patient will need to attend alcohol counceling prior to being accepted as a liver transplant candidate. Vaccinations   Vaccination for viral hepatitis A and B is recommended since the patient has no serologic evidence of previous exposure or vaccination with immunity. Vaccination for viral hepatitis A and B is not needed. The patient has serologic evidence of prior exposure or vaccination with immunity. Vaccination for viral hepatitis A and B has been initiated. Vaccination for viral hepatitis A and B has been completed. Serologic studies will be performed to assess response to vaccine. The need for vaccination against viral hepatitis A and B will be assessed with serologic and instituted as appropriate. Since the patient does not have a chronic liver disease which can lead to liver injury screening for HAV and HBV is not needed.     The patient has   not   received   2 doses and the booster dose   of COVID-19 vaccine. The patient should receive a booster dose of COVID-19 vaccine in ***/***. The patient had COVID-19 infection and recovered. The patient does not want to take COVID-19 vaccine. The patient was encouraged to take the COVID-19 vaccine. Routine vaccinations against other bacterial and viral agents can be performed as indicated. Annual flu vaccination should be administered if indicated. ALLERGIES  No Known Allergies    MEDICATIONS  Current Outpatient Medications   Medication Sig    NovoLOG Flexpen U-100 Insulin 100 unit/mL (3 mL) inpn inject 7 units with Breakfast, Lunch and 5 units with Dinner + CORRECTION MAX 40 UNITS PER DAY (MUST SEE NEW ENDO OR PCP FOR MORE REFILLS)    flash glucose sensor (FreeStyle Felix 2 Sensor) kit Use to check blood sugars    flash glucose scanning reader (FreeStyle Felix 2 [place]) misc Use to test blood sugars    insulin glargine (LANTUS,BASAGLAR) 100 unit/mL (3 mL) inpn (Basaglar or Lantus, whichever most preferred): INJECT 25 units each morning. ondansetron (ZOFRAN ODT) 4 mg disintegrating tablet Take 1 Tablet by mouth every eight (8) hours as needed for Nausea or Vomiting.  (Patient not taking: Reported on 12/30/2022)    Contour Next Test Strips strip use four times a day (Patient not taking: No sig reported)    Blood-Glucose Transmitter (Dexcom G6 Transmitter) michelle Use as directed (Patient not taking: No sig reported)    Dexcom G6 Sensor michelle Use as directed every 10 days (Patient not taking: No sig reported)    Blood-Glucose Meter,Continuous (Dexcom G6 ) misc Use as directed (Patient not taking: No sig reported)    Insulin Needles, Disposable, (BD Ultra-Fine Mini Pen Needle) 31 gauge x 3/16\" ndle Use 4 times daily with insulin pens (Patient not taking: No sig reported)    Insulin Syringe-Needle U-100 (BD Insulin Syringe Ult-Fine II) 0.5 mL 31 gauge x 5/16\" syrg Sig:use daily (Patient not taking: No sig reported)    Insulin Needles, Disposable, (BD Ultra-Fine Mini Pen Needle) 31 gauge x 3/16\" ndle Use 4 times daily with insulin pens (Patient not taking: No sig reported)    BD VEO INSULIN SYRINGE UF 1 mL 31 gauge x 1564\" syrg use 1 SYRINGE to inject MEDICATION subcutaneously twice a day (Patient not taking: No sig reported)     No current facility-administered medications for this visit. SYSTEM REVIEW NOT RELATED TO LIVER DISEASE OR REVIEWED ABOVE:  Constitution systems: Negative for fever, chills, weight gain, weight loss. Eyes: Negative for visual changes. ENT: Negative for sore throat, painful swallowing. Respiratory: Negative for cough, hemoptysis, SOB. Cardiology: Negative for chest pain, palpitations. GI:  Negative for constipation or diarrhea. : Negative for urinary frequency, dysuria, hematuria, nocturia. Skin: Negative for rash. Hematology: Negative for easy bruising, blood clots. Musculo-skelatal: Negative for back pain, muscle pain, weakness. Neurologic: Negative for headaches, dizziness, vertigo, memory problems not related to HE. Psychology: Negative for anxiety, depression. FAMILY HISTORY:  The patient is adopted and does not know any of her family history. The father    Has/had the following following chronic disease(s): ***.     of ***.    unknown cause. The patient has no knowledge of the father's medical condition. The mother   Has/had the following chronic disease(s): ***.     of ***.    unknown cause. The patient has no knowledge of the mother's medical condition. There is no family history of liver disease. The following family members have liver disease: There is no family history of immune disorders. The following family members have immune disorders:        SOCIAL HISTORY:  The patient   is . has never been . is . is . is . The patient has  no children. 1 child.     *** children,   *** stepchildren,   and   *** grandchildren. The patient   has never used tobacco products. stopped using tobacco products in ***/***.    currently smokes *** pack of tobacco daily. currently smokes *** cigarettes daily. currently smokes *** cigars daily. currently chews tobacco.      The patient vapes. The patient   smokes   uses edible   Marijuana. uses CBD oil. The patient   has never consumed significant amounts of alcohol. consumes *** alcoholic beverages per   day   week. consumes alcohol in excess. consumes alcohol on social occasions never in excess. has previously consumed alcohol in excess. has previously consumed alcohol socially never in excess. The patient has been abstinent from alcohol since ***/***. The patient   currently works full time as ***.    currently works part time as ***.    used to work as ***.    does not work outside the home. The patient   retired in ***.    has not worked since ***.    is applying for disability. is currently receiving disability. PHYSICAL EXAMINATION:  Visit Vitals  /83 (BP 1 Location: Right upper arm, BP Patient Position: Sitting, BP Cuff Size: Adult)   Pulse 93   Temp 97.9 °F (36.6 °C) (Temporal)   Resp 17   Ht 5' 5\" (1.651 m)   Wt 165 lb (74.8 kg)   LMP 12/30/2022   SpO2 98%   BMI 27.46 kg/m²       General: No acute distress. Eyes: Sclera anicteric. ENT: No oral lesions. Thyroid normal.  Nodes: No adenopathy. Skin: No spider angiomata. No jaundice. No palmar erythema. Respiratory: Lungs clear to auscultation. Cardiovascular: Regular heart rate. No murmurs. No JVD. Abdomen: Soft non-tender, liver size normal to percussion/palpation. Spleen not palpable. No obvious ascites. Extremities: No edema. No muscle wasting. No gross arthritic changes. Neurologic: Alert and oriented. Cranial nerves grossly intact. No asterixis.       LABORATORY STUDIES:  Recent liver function panel, CBC with platelet count and BMP are not available. These studies will be performed. SEROLOGIES:  Not available or performed. Testing was performed today. LIVER HISTOLOGY:  Not available or performed    ENDOSCOPIC PROCEDURES:  Not available or performed    RADIOLOGY:  Not available or performed    OTHER TESTING:  Not available or performed    FOLLOW-UP:  All of the issues listed above in the Assessment and Plan were discussed with the patient. All questions were answered. The patient expressed a clear understanding of the above. [address] [address] in ***   weeks   months   for Fibroscan   for elastography   2 weeks after liver biopsy. which should be 1-2 weeks after the next imaging study. and to initiate HCV treatment. to assess for the effects of diet changes and weight loss. to assess the effects and tolerability of ***.  for screening and enrollment into a clinical trial for treatment of ***. The patient was given a follow-up appointment for *** months in case she decides not to enter or is excluded from entering the clinical trial.  for routine monitoring. to review all data and determine the treatment plan.

## 2022-12-30 NOTE — Clinical Note
1/15/2023    Patient: Niki Corona   YOB: 1980   Date of Visit: 12/30/2022     Acacia Jeffery NP  [address]  [place] [id number]  Via Fax: [phone]     Helen Barkley MD  [address] [address]  Via Fax: [phone]    Dear SRIKANTH Gonzalez MD,      Thank you for referring Ms. Niki Corona to [address] [place] [address] Iveth Patel for evaluation. My notes for this consultation are attached. If you have questions, please do not hesitate to call me. I look forward to following your patient along with you.       Sincerely,    Shara Joseph MD

## 2022-12-31 LAB
ALBUMIN SERPL-MCNC: 4.6 G/DL (ref 3.8–4.8)
ALP SERPL-CCNC: 76 IU/L (ref 44–121)
ALT SERPL-CCNC: 24 IU/L (ref 0–32)
AST SERPL-CCNC: 30 IU/L (ref 0–40)
BASOPHILS # BLD AUTO: 0.1 X10E3/UL (ref 0–0.2)
BASOPHILS NFR BLD AUTO: 1 %
BILIRUB DIRECT SERPL-MCNC: 0.1 MG/DL (ref 0–0.4)
BILIRUB SERPL-MCNC: 0.2 MG/DL (ref 0–1.2)
BUN SERPL-MCNC: 17 MG/DL (ref 6–24)
BUN/CREAT SERPL: 25 (ref 9–23)
CALCIUM SERPL-MCNC: 9.7 MG/DL (ref 8.7–10.2)
CANCER AG19-9 SERPL-ACNC: 15 U/ML (ref 0–35)
CEA SERPL-MCNC: 1.5 NG/ML (ref 0–4.7)
CHLORIDE SERPL-SCNC: 100 MMOL/L (ref 96–106)
CO2 SERPL-SCNC: 25 MMOL/L (ref 20–29)
CREAT SERPL-MCNC: 0.68 MG/DL (ref 0.57–1)
EGFR: 111 ML/MIN/1.73
EOSINOPHIL # BLD AUTO: 0.1 X10E3/UL (ref 0–0.4)
EOSINOPHIL NFR BLD AUTO: 1 %
ERYTHROCYTE [DISTWIDTH] IN BLOOD BY AUTOMATED COUNT: 13.2 % (ref 11.7–15.4)
GLUCOSE SERPL-MCNC: 56 MG/DL (ref 70–99)
HBV CORE AB SERPL QL IA: NEGATIVE
HBV SURFACE AB SER QL: REACTIVE
HCT VFR BLD AUTO: 42.1 % (ref 34–46.6)
HCV AB S/CO SERPL IA: <0.1 S/CO RATIO (ref 0–0.9)
HCV AB SERPL QL IA: NORMAL
HGB BLD-MCNC: 13.8 G/DL (ref 11.1–15.9)
IMM GRANULOCYTES # BLD AUTO: 0 X10E3/UL (ref 0–0.1)
IMM GRANULOCYTES NFR BLD AUTO: 0 %
LYMPHOCYTES # BLD AUTO: 2 X10E3/UL (ref 0.7–3.1)
LYMPHOCYTES NFR BLD AUTO: 26 %
MCH RBC QN AUTO: 31 PG (ref 26.6–33)
MCHC RBC AUTO-ENTMCNC: 32.8 G/DL (ref 31.5–35.7)
MCV RBC AUTO: 95 FL (ref 79–97)
MONOCYTES # BLD AUTO: 0.7 X10E3/UL (ref 0.1–0.9)
MONOCYTES NFR BLD AUTO: 9 %
NEUTROPHILS # BLD AUTO: 4.9 X10E3/UL (ref 1.4–7)
NEUTROPHILS NFR BLD AUTO: 63 %
PLATELET # BLD AUTO: 311 X10E3/UL (ref 150–450)
POTASSIUM SERPL-SCNC: 4.4 MMOL/L (ref 3.5–5.2)
PROT SERPL-MCNC: 7.8 G/DL (ref 6–8.5)
RBC # BLD AUTO: 4.45 X10E6/UL (ref 3.77–5.28)
SODIUM SERPL-SCNC: 141 MMOL/L (ref 134–144)
WBC # BLD AUTO: 7.7 X10E3/UL (ref 3.4–10.8)

## 2023-01-05 LAB
AFP L3 MFR SERPL: NORMAL % (ref 0–9.9)
AFP SERPL-MCNC: 2.4 NG/ML (ref 0–6.4)

## 2023-01-10 NOTE — PROGRESS NOTES
Liver function & enzymes WNL. Cancer markers negative. [facility] letter sent to patient.

## 2023-01-11 ENCOUNTER — HOSPITAL ENCOUNTER (OUTPATIENT)
Dept: MRI IMAGING | Age: 43
Discharge: HOME | End: 2023-01-11
Attending: NURSE PRACTITIONER

## 2023-01-11 ENCOUNTER — TRANSCRIBE ORDER (OUTPATIENT)
Dept: SCHEDULING | Age: 43
End: 2023-01-11

## 2023-01-11 DIAGNOSIS — R16.0 LIVER MASS: Primary | ICD-10-CM

## 2023-01-11 DIAGNOSIS — R16.0 LIVER MASS: ICD-10-CM

## 2023-01-16 ENCOUNTER — HOSPITAL ENCOUNTER (OUTPATIENT)
Dept: MRI IMAGING | Age: 43
Discharge: HOME | End: 2023-01-16
Attending: NURSE PRACTITIONER

## 2023-01-16 DIAGNOSIS — R16.0 LIVER MASS: ICD-10-CM

## 2023-02-01 ENCOUNTER — OFFICE VISIT (OUTPATIENT)
Dept: HEMATOLOGY | Age: 43
End: 2023-02-01

## 2023-02-01 VITALS
TEMPERATURE: 97.8 F | BODY MASS INDEX: 27.09 KG/M2 | WEIGHT: 162.6 LBS | HEIGHT: 65 IN | HEART RATE: 91 BPM | DIASTOLIC BLOOD PRESSURE: 84 MMHG | RESPIRATION RATE: 17 BRPM | SYSTOLIC BLOOD PRESSURE: 137 MMHG | OXYGEN SATURATION: 98 %

## 2023-02-01 DIAGNOSIS — R16.0 LIVER MASS: Primary | ICD-10-CM

## 2023-02-01 NOTE — PROGRESS NOTES
[facility], MD, [address], [place], [place]      Rei CHU Mccoy, PCNP-BC   Jazz Talamantes, [facility]-AG   Kristal Benjamin, FNP-BARBRA Wilson, FNP-C   Aeljandra Grove, AGPCNP-BC      [address]   at [address], [address] [address] [place]  22.   [phone]   FAX: [address]   at [address], [address] [place] [address], [address]   [phone]   FAX: [phone]       Patient Care Team:  Rebeca Carlson NP as PCP - General (Nurse Practitioner)  Adela Baptiste MD as Physician (Endocrinology Physician)      Problem List  Date Reviewed: 1/15/2023            Codes Class Noted    Essential hypertension ICD-10-CM: I10  ICD-9-CM: 401.9  12/30/2022        Liver nodule ICD-10-CM: K76.89  ICD-9-CM: 573.8  12/30/2022        Type 1 diabetes mellitus without complication ([facility] IFO-08-GD: E10.9  ICD-9-CM: 250.01  12/30/2022        Pneumonia, organism unspecified-strep pneumo in sputum ICD-10-CM: J18.9  ICD-9-CM: 486  1/30/2011         Lynette Quinones is being seen at [address] for management of a liver mass. The active problem list, all pertinent past medical history, medications, radiologic findings   and laboratory findings related to the liver disorder were reviewed and discussed with the patient. The patient is a 43 y.o. Black female without any history of previous liver disease. All cancer markers were normal.     The patient underwent a CT scan for evaluation of non-specific abdominal pain in 9/2022. This demonstrated a single mass measuring 5 cm in segment 7. She was found to be COVID+ in 9/2022 whle being evalauted for the abdominal pain. Imaging studies of the liver suggest a normal liver in addition to the mass.       The patient does not have any symptoms which could be attributed to the liver disorder. The patient is not experiencing the following symptoms which are commonly seen in this liver disorder:   pain in the right side over the liver,   diffuse abdominal pain. The patient completes all daily activities without any functional limitations. Patient was scheduled for MRI to better characterize the segment 7 liver mass. She was unable to complete the study to claustrophobia. ASSESSMENT AND PLAN:  Liver mass   This is most likely to be benign in the absence of chronic liver disease. The patient is asymptomatic. Have performed laboratory testing to monitor liver function and degree of liver injury. This included BMP, hepatic panel, CBC with platelet count and INR. Liver transaminases are normal.  ALP is normal.  Liver function is normal.  The platelet count is normal.      Serologic testing for causes of chronic liver disease was negative for HCV and HBV. The patient attempted to undergo MRI of the abdomen but was unable to due to claustrophobia. She does feel comfortable with CT so we will try a triple phase CT to better characterize the lesion. Treatment of other medical problems in patients with chronic liver disease  There are no contraindications for the patient to take most medications that are necessary for treatment of other medical issues. The patient can take any medications utilized for treatment of DM and statins to treat hypercholesterolemia. Counseling for alcohol in patients with chronic liver disease  The patient does not have a chronic liver disease and does not have to be abstinent from alcohol. Vaccinations   Vaccination for viral hepatitis B is not needed. The patient has serologic evidence of prior exposure or vaccination with immunity. The patient has received 2 doses of COVID-19 vaccine. The patient had COVID-19 infection and recovered.       Routine vaccinations against other bacterial and viral agents can be performed as indicated. Annual flu vaccination should be administered if indicated. ALLERGIES  No Known Allergies    MEDICATIONS  Current Outpatient Medications   Medication Sig    NovoLOG Flexpen U-100 Insulin 100 unit/mL (3 mL) inpn inject 7 units with Breakfast, Lunch and 5 units with Dinner + CORRECTION MAX 40 UNITS PER DAY (MUST SEE NEW ENDO OR PCP FOR MORE REFILLS)    flash glucose sensor (FreeStyle Felix 2 Sensor) kit Use to check blood sugars    flash glucose scanning reader (FreeStyle Felix 2 [place]) misc Use to test blood sugars    insulin glargine (LANTUS,BASAGLAR) 100 unit/mL (3 mL) inpn (Basaglar or Lantus, whichever most preferred): INJECT 25 units each morning. No current facility-administered medications for this visit. SYSTEM REVIEW NOT RELATED TO LIVER DISEASE OR REVIEWED ABOVE:  Constitution systems: Negative for fever, chills, weight gain, weight loss. Eyes: Negative for visual changes. ENT: Negative for sore throat, painful swallowing. Respiratory: Negative for cough, hemoptysis, SOB. Cardiology: Negative for chest pain, palpitations. GI:  Negative for constipation or diarrhea. : Negative for urinary frequency, dysuria, hematuria, nocturia. Skin: Negative for rash. Hematology: Negative for easy bruising, blood clots. Musculo-skelatal: Negative for back pain, muscle pain, weakness. Neurologic: Negative for headaches, dizziness, vertigo, memory problems not related to HE. Psychology: Negative for anxiety, depression. FAMILY HISTORY:  The father   of cancer, type unknown. The mother has the following chronic disease(s): DMII, hypertension. There is no family history of liver disease. SOCIAL HISTORY:  The patient is single   The patient has 2 children. The patient currently smokes 1/2 pack of tobacco daily. The patient reports occasional alcohol use.    The patient currently works full time as a . PHYSICAL EXAMINATION:  Visit Vitals  /84 (BP 1 Location: Right upper arm, BP Patient Position: Sitting, BP Cuff Size: Adult)   Pulse 91   Temp 97.8 °F (36.6 °C) (Temporal)   Resp 17   Ht 5' 5\" (1.651 m)   Wt 162 lb 9.6 oz (73.8 kg)   LMP 01/24/2023 (Approximate)   SpO2 98%   BMI 27.06 kg/m²       General: No acute distress. Eyes: Sclera anicteric. ENT: No oral lesions. Thyroid normal.  Nodes: No adenopathy. Skin: No spider angiomata. No jaundice. No palmar erythema. Respiratory: Lungs clear to auscultation. Cardiovascular: Regular heart rate. No murmurs. No JVD. Abdomen: Soft non-tender, liver size normal to percussion/palpation. Spleen not palpable. No obvious ascites. Extremities: No edema. No muscle wasting. No gross arthritic changes. Neurologic: Alert and oriented. Cranial nerves grossly intact. No asterixis. LABORATORY STUDIES:  Liver [place] of [address] Units 12/30/2022 9/20/2022   WBC 3.4 - 10.8 x10E3/uL 7.7 7.1   ANC 1.4 - 7.0 x10E3/uL 4.9 5.4   HGB 11.1 - 15.9 g/dL 13.8 11.8    - 450 x10E3/uL 311 212   AST 0 - 40 IU/L 30 36   ALT 0 - 32 IU/L 24 39   Alk Phos 44 - 121 IU/L 76 67   Bili, Total 0.0 - 1.2 mg/dL 0.2 0.3   Bili, Direct 0.00 - 0.40 mg/dL 0.10    Albumin 3.8 - 4.8 g/dL 4.6 3.1 (L)   BUN 6 - 24 mg/dL 17 10   Creat 0.57 - 1.00 mg/dL 0.68 0.70   Na 134 - 144 mmol/L 141 133 (L)   K 3.5 - 5.2 mmol/L 4.4 3.7   Cl 96 - 106 mmol/L 100 96 (L)   CO2 20 - 29 mmol/L 25 26   Glucose 70 - 99 mg/dL 56 (L) 196 (H)   Magnesium 1.6 - 2.4 mg/dL  1.2 (L)       SEROLOGIES:  Serologies Latest Ref Rng & Units 12/30/2022   Hep B Core Ab, Total Negative Negative   Hep B Surface AB QL  Reactive   Hep C Ab 0.0 - 0.9 s/co ratio <0.1       LIVER HISTOLOGY:  Not available or performed. ENDOSCOPIC PROCEDURES:  Not available or performed    RADIOLOGY:  9/2022.   CT scan abdomen with IV contrast.  Normal appearing liver with indeterminate segment 7 hepatic mass, may represent area of focal fatty sparing. Normal spleen. No ascites. OTHER TESTING:  Not available or performed    FOLLOW-UP:  All of the issues listed above in the Assessment and Plan were discussed with the patient. All questions were answered. The patient expressed a clear understanding of the above. Follow-up Liver at [place] [address] will be determined based on results of CT imaging.      Nicole Pond, REAL-AG  [facility] of [address] [address] [address], [address] [place] [address] 22.  [phone]  [address] [place] [address]

## 2023-02-01 NOTE — PROGRESS NOTES
Identified pt with two pt identifiers(name and ). Reviewed record in preparation for visit and have obtained necessary documentation. Chief Complaint   Patient presents with    Other     Liver nodule  1 mo f/u      Vitals:    23 1336   BP: 137/84   Pulse: 91   Resp: 17   Temp: 97.8 °F (36.6 °C)   TempSrc: Temporal   SpO2: 98%   Weight: 162 lb 9.6 oz (73.8 kg)   Height: 5' 5\" (1.651 m)   PainSc:   0 - No pain   LMP: 2023       Health Maintenance Review: Patient reminded of \"due or due soon\" health maintenance. I have asked the patient to contact his/her primary care provider (PCP) for follow-up on his/her health maintenance. Coordination of Care Questionnaire:  :   1) Have you been to an emergency room, urgent care, or hospitalized since your last visit? If yes, where when, and reason for visit? no       2. Have seen or consulted any other health care provider since your last visit? If yes, where when, and reason for visit? NO      Patient is accompanied by self I have received verbal consent from Kenyatta Silva to discuss any/all medical information while they are present in the room.

## 2023-02-20 ENCOUNTER — HOSPITAL ENCOUNTER (OUTPATIENT)
Dept: CT IMAGING | Age: 43
Discharge: HOME | End: 2023-02-20
Attending: NURSE PRACTITIONER
Payer: MEDICAID

## 2023-02-20 DIAGNOSIS — R16.0 LIVER MASS: ICD-10-CM

## 2023-02-20 LAB — CREAT BLD-MCNC: 0.7 MG/DL (ref 0.6–1.3)

## 2023-02-20 PROCEDURE — 74170 CT ABD WO CNTRST FLWD CNTRST: CPT

## 2023-02-20 PROCEDURE — 82565 ASSAY OF CREATININE: CPT

## 2023-02-20 PROCEDURE — 74011000636 HC RX REV CODE- 636: Performed by: NURSE PRACTITIONER

## 2023-02-20 RX ADMIN — IOPAMIDOL 100 ML: 755 INJECTION, SOLUTION INTRAVENOUS at 15:18

## 2023-02-23 ENCOUNTER — DOCUMENTATION ONLY (OUTPATIENT)
Dept: HEMATOLOGY | Age: 43
End: 2023-02-23

## 2023-02-23 DIAGNOSIS — R16.0 LIVER MASS: Primary | ICD-10-CM

## 2023-02-23 NOTE — PROGRESS NOTES
LVM and sent [facility] message to pt. Imaging read as 5cm adenoma. Asked to clarify if pt is taking or has recently been on birth control. Will plan on surveillance imaging in 3 months to verify lesion is still being read as adenoma & to assess for growth. Will schedule follow up in clinic post-imaging.

## 2023-02-24 RX ORDER — FLASH GLUCOSE SENSOR
KIT MISCELLANEOUS
Qty: 6 KIT | Refills: 3 | OUTPATIENT
Start: 2023-02-24

## 2023-04-21 DIAGNOSIS — Z12.31 OTHER SCREENING MAMMOGRAM: Primary | ICD-10-CM

## 2023-04-29 DIAGNOSIS — R16.0 LIVER MASS: Primary | ICD-10-CM

## 2023-04-29 DIAGNOSIS — D36.9 ADENOMA: ICD-10-CM

## 2023-05-15 ENCOUNTER — APPOINTMENT (OUTPATIENT)
Facility: HOSPITAL | Age: 43
End: 2023-05-15
Payer: MEDICAID

## 2023-05-15 ENCOUNTER — HOSPITAL ENCOUNTER (EMERGENCY)
Facility: HOSPITAL | Age: 43
Discharge: HOME | End: 2023-05-15
Attending: EMERGENCY MEDICINE
Payer: MEDICAID

## 2023-05-15 VITALS
WEIGHT: 166.5 LBS | RESPIRATION RATE: 16 BRPM | HEIGHT: 64 IN | OXYGEN SATURATION: 97 % | BODY MASS INDEX: 28.42 KG/M2 | HEART RATE: 106 BPM | DIASTOLIC BLOOD PRESSURE: 83 MMHG | SYSTOLIC BLOOD PRESSURE: 151 MMHG | TEMPERATURE: 98 F

## 2023-05-15 DIAGNOSIS — S63.91XA SPRAIN OF RIGHT HAND, INITIAL ENCOUNTER: Primary | ICD-10-CM

## 2023-05-15 PROCEDURE — 99283 EMERGENCY DEPT VISIT LOW MDM: CPT

## 2023-05-15 PROCEDURE — 73130 X-RAY EXAM OF HAND: CPT

## 2023-05-15 RX ORDER — NAPROXEN 500 MG/1
500 TABLET ORAL 2 TIMES DAILY WITH MEALS
Qty: 14 TABLET | Refills: 0 | Status: SHIPPED | OUTPATIENT
Start: 2023-05-15 | End: 2023-05-22

## 2023-05-15 ASSESSMENT — PAIN DESCRIPTION - ORIENTATION: ORIENTATION: RIGHT

## 2023-05-15 ASSESSMENT — PAIN DESCRIPTION - DESCRIPTORS: DESCRIPTORS: ACHING

## 2023-05-15 ASSESSMENT — PAIN DESCRIPTION - LOCATION: LOCATION: HAND

## 2023-05-15 ASSESSMENT — LIFESTYLE VARIABLES
HOW OFTEN DO YOU HAVE A DRINK CONTAINING ALCOHOL: NEVER
HOW MANY STANDARD DRINKS CONTAINING ALCOHOL DO YOU HAVE ON A TYPICAL DAY: PATIENT DOES NOT DRINK

## 2023-05-15 ASSESSMENT — PAIN SCALES - GENERAL: PAINLEVEL_OUTOF10: 8

## 2023-05-15 ASSESSMENT — PAIN - FUNCTIONAL ASSESSMENT: PAIN_FUNCTIONAL_ASSESSMENT: 0-10

## 2023-05-15 NOTE — ED NOTES
Patient (s)  given copy of dc instructions and 1 script(s). Patient (s)  verbalized understanding of instructions and script (s). Patient given a current medication reconciliation form and verbalized understanding of their medications. Patient (s) verbalized understanding of the importance of discussing medications with his or her physician or clinic they will be following up with. Patient alert and oriented and in no acute distress. Patient discharged home ambulatory with self.       Rigoberto Alejandra RN  05/15/23 1903

## 2023-05-15 NOTE — ED NOTES
Pt is alert and oriented x 4, RR even and unlabored, skin is warm and dry. Assesment completed and pt updated on plan of care. Emergency Department Nursing Plan of Care       The Nursing Plan of Care is developed from the Nursing assessment and Emergency Department Attending provider initial evaluation. The plan of care may be reviewed in the ED Provider note.     The Plan of Care was developed with the following considerations:   Patient / Family readiness to learn indicated by:verbalized understanding and successful return demonstration  Persons(s) to be included in education: patient  Barriers to Learning/Limitations:None    Signed     [address] Crystal Robles RN    5/15/2023   2:40 PM       [address], RN  05/15/23 6712

## 2023-05-15 NOTE — ED PROVIDER NOTES
[facility] EMERGENCY DEPT  EMERGENCY DEPARTMENT ENCOUNTER       Pt Name: Rabia Aguilar  MRN: [id number]  [place] 1980  Date of evaluation: 5/15/2023  Provider: Ashley Marion PA-C   PCP: ALEXYS Atkins NP  Note Started: 4:22 PM 5/15/23     CHIEF COMPLAINT       Chief Complaint   Patient presents with    Hand Injury        HISTORY OF PRESENT ILLNESS: 1 or more elements      History From: Patient  None     Rabia Aguilar is a 43 y.o. female with PMH DM and who presents complaining of right hand pain that occurred after she had a fall on outstretched hand last night. She reports she was running around with her children and tripped and fell onto an outstretched hand onto grass. She is now complaining of right hand swelling and pain to the dorsum of her third knuckle that started this morning. She is not on blood thinners. She denies LOC, head injury, nausea, vomiting, fever, chills, numbness, and tingling, chest pain, SOB. Nursing Notes were all reviewed and agreed with or any disagreements were addressed in the HPI. REVIEW OF SYSTEMS      Review of Systems     Positives and Pertinent negatives as per HPI.     PAST HISTORY     Past Medical History:  Past Medical History:   Diagnosis Date    Diabetes ([facility]     type 1; endocrinologist at [facility]    Other ill-defined conditions(799.89)     DKA    PNA (pneumonia)     ; strep pneumococcus    Strep pharyngitis 3/13    with sepsis       Past Surgical History:  Past Surgical History:   Procedure Laterality Date    GYN          ORTHOPEDIC SURGERY      left foot surgery       Family History:  Family History   Problem Relation Age of Onset    Cancer Father     Diabetes Mother     Heart Disease Mother        Social History:  Social History     Tobacco Use    Smoking status: Every Day     Packs/day: 0.50     Types: Cigarettes    Smokeless tobacco: Never   Substance Use Topics    Alcohol use: Yes    Drug use: No       Allergies:  No Known

## 2023-07-31 ENCOUNTER — HOSPITAL ENCOUNTER (OUTPATIENT)
Facility: HOSPITAL | Age: 43
Discharge: HOME | End: 2023-08-03
Payer: MEDICAID

## 2023-07-31 DIAGNOSIS — D36.9 ADENOMA: ICD-10-CM

## 2023-07-31 DIAGNOSIS — R16.0 LIVER MASS: ICD-10-CM

## 2023-07-31 LAB — CREAT BLD-MCNC: 0.7 MG/DL (ref 0.6–1.3)

## 2023-07-31 PROCEDURE — 6360000004 HC RX CONTRAST MEDICATION: Performed by: NURSE PRACTITIONER

## 2023-07-31 PROCEDURE — 74170 CT ABD WO CNTRST FLWD CNTRST: CPT

## 2023-07-31 PROCEDURE — 82565 ASSAY OF CREATININE: CPT

## 2023-07-31 RX ADMIN — IOPAMIDOL 100 ML: 755 INJECTION, SOLUTION INTRAVENOUS at 15:02

## 2023-08-24 ENCOUNTER — OFFICE VISIT (OUTPATIENT)
Age: 43
End: 2023-08-24
Payer: MEDICAID

## 2023-08-24 VITALS
SYSTOLIC BLOOD PRESSURE: 135 MMHG | BODY MASS INDEX: 27.35 KG/M2 | OXYGEN SATURATION: 98 % | RESPIRATION RATE: 16 BRPM | WEIGHT: 160.2 LBS | DIASTOLIC BLOOD PRESSURE: 82 MMHG | TEMPERATURE: 97.9 F | HEART RATE: 97 BPM | HEIGHT: 64 IN

## 2023-08-24 DIAGNOSIS — D13.4 HEPATIC ADENOMA: Primary | ICD-10-CM

## 2023-08-24 PROBLEM — K76.89 LIVER NODULE: Status: RESOLVED | Noted: 2022-12-30 | Resolved: 2023-08-24

## 2023-08-24 PROCEDURE — 3075F SYST BP GE 130 - 139MM HG: CPT | Performed by: NURSE PRACTITIONER

## 2023-08-24 PROCEDURE — 99214 OFFICE O/P EST MOD 30 MIN: CPT | Performed by: NURSE PRACTITIONER

## 2023-08-24 PROCEDURE — 3079F DIAST BP 80-89 MM HG: CPT | Performed by: NURSE PRACTITIONER

## 2023-08-24 NOTE — PROGRESS NOTES
MD Triny, FACP, [place], [place]      HENRIQUE Dotson, [facility]   Tien Flores, [facility]-   Jahaira Cummins, JONG Han, JONG Duncan, [facility]   Aysha Lyles, [facility]      [address]   at [facility]   [address] [employer], [address] [facility], [address] [facility]   [phone]   FAX: [phone]   at [facility], [address] [facility], [address]   [phone]   FAX: [phone]         Patient Active Problem List    Diagnosis Date Noted    Hepatic adenoma 08/24/2023    Essential hypertension 12/30/2022    Type 1 diabetes mellitus without complication ([address]) 52/05/9559       Sydni James is being seen at [address] [place] [address],[address] [facility] for management of a hepatic adenoma. The active problem list, all pertinent past medical history, medications, radiologic findings and laboratory findings related to the liver disorder were reviewed and discussed with the patient. The patient is a 43 y. o.female without any history of previous liver disease. All cancer markers were normal.     The patient underwent a CT scan for evaluation of non-specific abdominal pain in 9/2022. This demonstrated a single mass measuring 5 cm in segment 7. Imaging studies of the liver suggest a normal liver in addition to the mass. The most recent imaging with triple phase CT performed in 8/2023 demonstrated interval decrease in size to 4.4 cm segment 7 lesion and classified it as a likely hemangioma. The patient does not have any symptoms which could be attributed to the liver disorder. The patient is not experiencing the following symptoms which are commonly seen in this liver disorder:   pain in the right side over the liver,   diffuse abdominal pain.      The patient

## 2023-09-08 RX ORDER — INSULIN ASPART 100 [IU]/ML
INJECTION, SOLUTION INTRAVENOUS; SUBCUTANEOUS
Qty: 15 ML | OUTPATIENT
Start: 2023-09-08

## 2023-09-11 ENCOUNTER — HOSPITAL ENCOUNTER (EMERGENCY)
Facility: HOSPITAL | Age: 43
Discharge: HOME | End: 2023-09-11
Attending: EMERGENCY MEDICINE
Payer: MEDICAID

## 2023-09-11 ENCOUNTER — APPOINTMENT (OUTPATIENT)
Facility: HOSPITAL | Age: 43
End: 2023-09-11
Payer: MEDICAID

## 2023-09-11 VITALS
TEMPERATURE: 97.8 F | RESPIRATION RATE: 22 BRPM | SYSTOLIC BLOOD PRESSURE: 142 MMHG | DIASTOLIC BLOOD PRESSURE: 98 MMHG | BODY MASS INDEX: 28.17 KG/M2 | HEART RATE: 95 BPM | WEIGHT: 165 LBS | OXYGEN SATURATION: 97 % | HEIGHT: 64 IN

## 2023-09-11 DIAGNOSIS — R06.02 SHORTNESS OF BREATH: ICD-10-CM

## 2023-09-11 DIAGNOSIS — R42 LIGHTHEADEDNESS: Primary | ICD-10-CM

## 2023-09-11 LAB
ALBUMIN SERPL-MCNC: 3.1 G/DL (ref 3.5–5)
ALBUMIN/GLOB SERPL: 0.8 (ref 1.1–2.2)
ALP SERPL-CCNC: 63 U/L (ref 45–117)
ALT SERPL-CCNC: 22 U/L (ref 12–78)
ANION GAP SERPL CALC-SCNC: 6 MMOL/L (ref 5–15)
APPEARANCE UR: CLEAR
AST SERPL-CCNC: 20 U/L (ref 15–37)
BACTERIA URNS QL MICRO: ABNORMAL /HPF
BASOPHILS # BLD: 0.1 K/UL (ref 0–0.1)
BASOPHILS NFR BLD: 1 % (ref 0–1)
BILIRUB SERPL-MCNC: 0.3 MG/DL (ref 0.2–1)
BILIRUB UR QL: NEGATIVE
BUN SERPL-MCNC: 16 MG/DL (ref 6–20)
BUN/CREAT SERPL: 14 (ref 12–20)
CALCIUM SERPL-MCNC: 8.3 MG/DL (ref 8.5–10.1)
CHLORIDE SERPL-SCNC: 98 MMOL/L (ref 97–108)
CO2 SERPL-SCNC: 30 MMOL/L (ref 21–32)
COLOR UR: ABNORMAL
CREAT SERPL-MCNC: 1.14 MG/DL (ref 0.55–1.02)
DIFFERENTIAL METHOD BLD: ABNORMAL
EOSINOPHIL # BLD: 0 K/UL (ref 0–0.4)
EOSINOPHIL NFR BLD: 0 % (ref 0–7)
EPITH CASTS URNS QL MICRO: ABNORMAL /LPF
ERYTHROCYTE [DISTWIDTH] IN BLOOD BY AUTOMATED COUNT: 13.7 % (ref 11.5–14.5)
GLOBULIN SER CALC-MCNC: 4.1 G/DL (ref 2–4)
GLUCOSE BLD STRIP.AUTO-MCNC: 289 MG/DL (ref 65–117)
GLUCOSE SERPL-MCNC: 267 MG/DL (ref 65–100)
GLUCOSE UR STRIP.AUTO-MCNC: 250 MG/DL
HCG UR QL: NEGATIVE
HCT VFR BLD AUTO: 35.5 % (ref 35–47)
HGB BLD-MCNC: 12.2 G/DL (ref 11.5–16)
HGB UR QL STRIP: NEGATIVE
IMM GRANULOCYTES # BLD AUTO: 0.1 K/UL (ref 0–0.04)
IMM GRANULOCYTES NFR BLD AUTO: 1 % (ref 0–0.5)
KETONES UR QL STRIP.AUTO: ABNORMAL MG/DL
LEUKOCYTE ESTERASE UR QL STRIP.AUTO: NEGATIVE
LYMPHOCYTES # BLD: 1.3 K/UL (ref 0.8–3.5)
LYMPHOCYTES NFR BLD: 12 % (ref 12–49)
MCH RBC QN AUTO: 30.8 PG (ref 26–34)
MCHC RBC AUTO-ENTMCNC: 34.4 G/DL (ref 30–36.5)
MCV RBC AUTO: 89.6 FL (ref 80–99)
MONOCYTES # BLD: 1 K/UL (ref 0–1)
MONOCYTES NFR BLD: 9 % (ref 5–13)
NEUTS SEG # BLD: 8.5 K/UL (ref 1.8–8)
NEUTS SEG NFR BLD: 77 % (ref 32–75)
NITRITE UR QL STRIP.AUTO: NEGATIVE
NRBC # BLD: 0 K/UL (ref 0–0.01)
NRBC BLD-RTO: 0 PER 100 WBC
NT PRO BNP: 24 PG/ML (ref 0–125)
PH UR STRIP: 6 (ref 5–8)
PLATELET # BLD AUTO: 263 K/UL (ref 150–400)
PMV BLD AUTO: 10.8 FL (ref 8.9–12.9)
POTASSIUM SERPL-SCNC: 3.6 MMOL/L (ref 3.5–5.1)
PROT SERPL-MCNC: 7.2 G/DL (ref 6.4–8.2)
PROT UR STRIP-MCNC: 100 MG/DL
RBC # BLD AUTO: 3.96 M/UL (ref 3.8–5.2)
RBC #/AREA URNS HPF: ABNORMAL /HPF (ref 0–5)
RBC MORPH BLD: ABNORMAL
SERVICE CMNT-IMP: ABNORMAL
SODIUM SERPL-SCNC: 134 MMOL/L (ref 136–145)
SP GR UR REFRACTOMETRY: 1.02
TROPONIN I SERPL HS-MCNC: 6 NG/L (ref 0–51)
URINE CULTURE IF INDICATED: ABNORMAL
UROBILINOGEN UR QL STRIP.AUTO: 1 EU/DL (ref 0.2–1)
WBC # BLD AUTO: 11 K/UL (ref 3.6–11)
WBC URNS QL MICRO: ABNORMAL /HPF (ref 0–4)

## 2023-09-11 PROCEDURE — 2580000003 HC RX 258: Performed by: EMERGENCY MEDICINE

## 2023-09-11 PROCEDURE — 82962 GLUCOSE BLOOD TEST: CPT

## 2023-09-11 PROCEDURE — 81001 URINALYSIS AUTO W/SCOPE: CPT

## 2023-09-11 PROCEDURE — 36415 COLL VENOUS BLD VENIPUNCTURE: CPT

## 2023-09-11 PROCEDURE — 83880 ASSAY OF NATRIURETIC PEPTIDE: CPT

## 2023-09-11 PROCEDURE — 80053 COMPREHEN METABOLIC PANEL: CPT

## 2023-09-11 PROCEDURE — 85025 COMPLETE CBC W/AUTO DIFF WBC: CPT

## 2023-09-11 PROCEDURE — 71275 CT ANGIOGRAPHY CHEST: CPT

## 2023-09-11 PROCEDURE — 99285 EMERGENCY DEPT VISIT HI MDM: CPT

## 2023-09-11 PROCEDURE — 84484 ASSAY OF TROPONIN QUANT: CPT

## 2023-09-11 PROCEDURE — 93005 ELECTROCARDIOGRAM TRACING: CPT | Performed by: EMERGENCY MEDICINE

## 2023-09-11 PROCEDURE — 6360000004 HC RX CONTRAST MEDICATION: Performed by: EMERGENCY MEDICINE

## 2023-09-11 PROCEDURE — 81025 URINE PREGNANCY TEST: CPT

## 2023-09-11 RX ORDER — 0.9 % SODIUM CHLORIDE 0.9 %
1000 INTRAVENOUS SOLUTION INTRAVENOUS ONCE
Status: COMPLETED | OUTPATIENT
Start: 2023-09-11 | End: 2023-09-11

## 2023-09-11 RX ADMIN — SODIUM CHLORIDE 1000 ML: 9 INJECTION, SOLUTION INTRAVENOUS at 16:53

## 2023-09-11 RX ADMIN — IOPAMIDOL 80 ML: 755 INJECTION, SOLUTION INTRAVENOUS at 19:31

## 2023-09-11 ASSESSMENT — PAIN SCALES - GENERAL: PAINLEVEL_OUTOF10: 0

## 2023-09-11 ASSESSMENT — PAIN - FUNCTIONAL ASSESSMENT: PAIN_FUNCTIONAL_ASSESSMENT: 0-10

## 2023-09-11 NOTE — ED TRIAGE NOTES
Pt arrives via EMS for sudden onset of SOB with dizziness and near syncopal episode. Pt arrives hypotensive and tachycardic. Glucose 229 in route.

## 2023-09-12 LAB
EKG ATRIAL RATE: 101 BPM
EKG DIAGNOSIS: NORMAL
EKG P AXIS: 70 DEGREES
EKG P-R INTERVAL: 138 MS
EKG Q-T INTERVAL: 370 MS
EKG QRS DURATION: 84 MS
EKG QTC CALCULATION (BAZETT): 479 MS
EKG R AXIS: 79 DEGREES
EKG T AXIS: 57 DEGREES
EKG VENTRICULAR RATE: 101 BPM

## 2023-09-12 PROCEDURE — 93010 ELECTROCARDIOGRAM REPORT: CPT | Performed by: SPECIALIST

## 2023-09-12 NOTE — ED NOTES
Patient (s)  given copy of dc instructions and 0 script(s). Patient (s)  verbalized understanding of instructions and script (s). Patient given a current medication reconciliation form and verbalized understanding of their medications. Patient (s) verbalized understanding of the importance of discussing medications with  his or her physician or clinic they will be following up with. Patient alert and oriented and in no acute distress. Patient discharged home ambulatory with self.         Josesito Capone RN  09/11/23 6134

## 2023-09-12 NOTE — ED PROVIDER NOTES
pulmonary embolism. 2. Clear lungs. PROCEDURES   Unless otherwise noted below, none  Procedures     CRITICAL CARE TIME   none    EMERGENCY DEPARTMENT COURSE and DIFFERENTIAL DIAGNOSIS/MDM   Vitals:    Vitals:    09/11/23 1907 09/11/23 2012 09/11/23 2013 09/11/23 2014   BP:  (!) 161/97 (!) 156/95 (!) 142/98   Pulse: 92 90 90 95   Resp: 22      Temp:       TempSrc:       SpO2: 98% 97% 97% 97%   Weight:       Height:            Patient was given the following medications:  Medications   sodium chloride 0.9 % bolus 1,000 mL (0 mLs IntraVENous Stopped 9/11/23 1724)   iopamidol (ISOVUE-370) 76 % injection 100 mL (80 mLs IntraVENous Given 9/11/23 1931)       Medical Decision Making  70-year-old female with history of diabetes who presents with a chief complaint of shortness of breath and lightheadedness. Patient states she was on her way to work when her symptoms began suddenly. She denies any associated chest pain. No nausea or vomiting. Was in her normal state of health yesterday and this morning. EMS reports the patient was tachycardic with mildly low blood pressure on their arrival.  Patient denies any abdominal pain, nausea, or vomiting. No cardiac history. No history of DVT or PE. No unilateral leg swelling. On arrival she is mildly tachycardic with low normal blood pressure. Overall nontoxic-appearing. Differential includes electrolyte imbalance, anemia, ACS, PE    Will check basic lab work to rule out severe electrolyte derangements or anemia. .  Will check troponin and ekg to rule out ACS  Will check CTA to r/o PE    Problems Addressed:  Lightheadedness: acute illness or injury  Shortness of breath: acute illness or injury    Amount and/or Complexity of Data Reviewed  Labs: ordered. Decision-making details documented in ED Course. Radiology: ordered. Decision-making details documented in ED Course. ECG/medicine tests: ordered and independent interpretation performed.  Decision-making

## 2023-10-09 ENCOUNTER — APPOINTMENT (OUTPATIENT)
Facility: HOSPITAL | Age: 43
End: 2023-10-09
Payer: MEDICAID

## 2023-10-09 ENCOUNTER — HOSPITAL ENCOUNTER (EMERGENCY)
Facility: HOSPITAL | Age: 43
Discharge: HOME | End: 2023-10-09
Payer: MEDICAID

## 2023-10-09 VITALS
WEIGHT: 159 LBS | SYSTOLIC BLOOD PRESSURE: 151 MMHG | BODY MASS INDEX: 27.14 KG/M2 | HEART RATE: 96 BPM | OXYGEN SATURATION: 98 % | HEIGHT: 64 IN | TEMPERATURE: 97.1 F | RESPIRATION RATE: 20 BRPM | DIASTOLIC BLOOD PRESSURE: 72 MMHG

## 2023-10-09 DIAGNOSIS — D36.9 ADENOMA: ICD-10-CM

## 2023-10-09 DIAGNOSIS — R73.9 HYPERGLYCEMIA: ICD-10-CM

## 2023-10-09 DIAGNOSIS — R19.7 DIARRHEA, UNSPECIFIED TYPE: ICD-10-CM

## 2023-10-09 DIAGNOSIS — R10.9 ACUTE ABDOMINAL PAIN: Primary | ICD-10-CM

## 2023-10-09 DIAGNOSIS — R11.0 NAUSEA: ICD-10-CM

## 2023-10-09 LAB
ALBUMIN SERPL-MCNC: 3.1 G/DL (ref 3.5–5)
ALBUMIN/GLOB SERPL: 0.7 (ref 1.1–2.2)
ALP SERPL-CCNC: 70 U/L (ref 45–117)
ALT SERPL-CCNC: 18 U/L (ref 12–78)
ANION GAP SERPL CALC-SCNC: 7 MMOL/L (ref 5–15)
APPEARANCE UR: CLEAR
AST SERPL-CCNC: 20 U/L (ref 15–37)
BACTERIA URNS QL MICRO: NEGATIVE /HPF
BASOPHILS # BLD: 0.1 K/UL (ref 0–0.1)
BASOPHILS NFR BLD: 1 % (ref 0–1)
BILIRUB SERPL-MCNC: 0.4 MG/DL (ref 0.2–1)
BILIRUB UR QL: NEGATIVE
BUN SERPL-MCNC: 17 MG/DL (ref 6–20)
BUN/CREAT SERPL: 16 (ref 12–20)
CALCIUM SERPL-MCNC: 8.4 MG/DL (ref 8.5–10.1)
CHLORIDE SERPL-SCNC: 98 MMOL/L (ref 97–108)
CO2 SERPL-SCNC: 28 MMOL/L (ref 21–32)
COLOR UR: ABNORMAL
CREAT SERPL-MCNC: 1.04 MG/DL (ref 0.55–1.02)
DIFFERENTIAL METHOD BLD: ABNORMAL
EOSINOPHIL # BLD: 0 K/UL (ref 0–0.4)
EOSINOPHIL NFR BLD: 0 % (ref 0–7)
EPITH CASTS URNS QL MICRO: ABNORMAL /LPF
ERYTHROCYTE [DISTWIDTH] IN BLOOD BY AUTOMATED COUNT: 13.8 % (ref 11.5–14.5)
GLOBULIN SER CALC-MCNC: 4.4 G/DL (ref 2–4)
GLUCOSE SERPL-MCNC: 289 MG/DL (ref 65–100)
GLUCOSE UR STRIP.AUTO-MCNC: 500 MG/DL
HCT VFR BLD AUTO: 36.6 % (ref 35–47)
HGB BLD-MCNC: 12.4 G/DL (ref 11.5–16)
HGB UR QL STRIP: NEGATIVE
IMM GRANULOCYTES # BLD AUTO: 0 K/UL (ref 0–0.04)
IMM GRANULOCYTES NFR BLD AUTO: 0 % (ref 0–0.5)
KETONES UR QL STRIP.AUTO: 15 MG/DL
LEUKOCYTE ESTERASE UR QL STRIP.AUTO: NEGATIVE
LIPASE SERPL-CCNC: 14 U/L (ref 13–75)
LYMPHOCYTES # BLD: 1.2 K/UL (ref 0.8–3.5)
LYMPHOCYTES NFR BLD: 12 % (ref 12–49)
MAGNESIUM SERPL-MCNC: 1.5 MG/DL (ref 1.6–2.4)
MCH RBC QN AUTO: 31.2 PG (ref 26–34)
MCHC RBC AUTO-ENTMCNC: 33.9 G/DL (ref 30–36.5)
MCV RBC AUTO: 92.2 FL (ref 80–99)
MONOCYTES # BLD: 0.8 K/UL (ref 0–1)
MONOCYTES NFR BLD: 8 % (ref 5–13)
NEUTS SEG # BLD: 8.2 K/UL (ref 1.8–8)
NEUTS SEG NFR BLD: 79 % (ref 32–75)
NITRITE UR QL STRIP.AUTO: NEGATIVE
NRBC # BLD: 0 K/UL (ref 0–0.01)
NRBC BLD-RTO: 0 PER 100 WBC
PH UR STRIP: 6 (ref 5–8)
PLATELET # BLD AUTO: 288 K/UL (ref 150–400)
PMV BLD AUTO: 10.8 FL (ref 8.9–12.9)
POTASSIUM SERPL-SCNC: 4.6 MMOL/L (ref 3.5–5.1)
PROT SERPL-MCNC: 7.5 G/DL (ref 6.4–8.2)
PROT UR STRIP-MCNC: 100 MG/DL
RBC # BLD AUTO: 3.97 M/UL (ref 3.8–5.2)
RBC #/AREA URNS HPF: ABNORMAL /HPF (ref 0–5)
SODIUM SERPL-SCNC: 133 MMOL/L (ref 136–145)
SP GR UR REFRACTOMETRY: 1.02 (ref 1–1.03)
URINE CULTURE IF INDICATED: ABNORMAL
UROBILINOGEN UR QL STRIP.AUTO: 1 EU/DL (ref 0.2–1)
WBC # BLD AUTO: 10.4 K/UL (ref 3.6–11)
WBC URNS QL MICRO: ABNORMAL /HPF (ref 0–4)

## 2023-10-09 PROCEDURE — 80053 COMPREHEN METABOLIC PANEL: CPT

## 2023-10-09 PROCEDURE — 36415 COLL VENOUS BLD VENIPUNCTURE: CPT

## 2023-10-09 PROCEDURE — 2580000003 HC RX 258: Performed by: PHYSICIAN ASSISTANT

## 2023-10-09 PROCEDURE — 74177 CT ABD & PELVIS W/CONTRAST: CPT

## 2023-10-09 PROCEDURE — 96375 TX/PRO/DX INJ NEW DRUG ADDON: CPT

## 2023-10-09 PROCEDURE — 81001 URINALYSIS AUTO W/SCOPE: CPT

## 2023-10-09 PROCEDURE — 99285 EMERGENCY DEPT VISIT HI MDM: CPT

## 2023-10-09 PROCEDURE — 6360000002 HC RX W HCPCS: Performed by: PHYSICIAN ASSISTANT

## 2023-10-09 PROCEDURE — 85025 COMPLETE CBC W/AUTO DIFF WBC: CPT

## 2023-10-09 PROCEDURE — 83690 ASSAY OF LIPASE: CPT

## 2023-10-09 PROCEDURE — 6360000004 HC RX CONTRAST MEDICATION: Performed by: PHYSICIAN ASSISTANT

## 2023-10-09 PROCEDURE — 96361 HYDRATE IV INFUSION ADD-ON: CPT

## 2023-10-09 PROCEDURE — 96365 THER/PROPH/DIAG IV INF INIT: CPT

## 2023-10-09 PROCEDURE — 83735 ASSAY OF MAGNESIUM: CPT

## 2023-10-09 RX ORDER — MAGNESIUM SULFATE 1 G/100ML
1000 INJECTION INTRAVENOUS
Status: COMPLETED | OUTPATIENT
Start: 2023-10-09 | End: 2023-10-09

## 2023-10-09 RX ORDER — ONDANSETRON 2 MG/ML
4 INJECTION INTRAMUSCULAR; INTRAVENOUS ONCE
Status: COMPLETED | OUTPATIENT
Start: 2023-10-09 | End: 2023-10-09

## 2023-10-09 RX ORDER — ONDANSETRON 4 MG/1
4 TABLET, ORALLY DISINTEGRATING ORAL 3 TIMES DAILY PRN
Qty: 21 TABLET | Refills: 0 | Status: SHIPPED | OUTPATIENT
Start: 2023-10-09

## 2023-10-09 RX ORDER — 0.9 % SODIUM CHLORIDE 0.9 %
1000 INTRAVENOUS SOLUTION INTRAVENOUS ONCE
Status: COMPLETED | OUTPATIENT
Start: 2023-10-09 | End: 2023-10-09

## 2023-10-09 RX ORDER — DICYCLOMINE HCL 20 MG
20 TABLET ORAL 4 TIMES DAILY
Qty: 20 TABLET | Refills: 0 | Status: SHIPPED | OUTPATIENT
Start: 2023-10-09

## 2023-10-09 RX ORDER — MORPHINE SULFATE 4 MG/ML
4 INJECTION, SOLUTION INTRAMUSCULAR; INTRAVENOUS
Status: COMPLETED | OUTPATIENT
Start: 2023-10-09 | End: 2023-10-09

## 2023-10-09 RX ADMIN — IOPAMIDOL 100 ML: 755 INJECTION, SOLUTION INTRAVENOUS at 20:29

## 2023-10-09 RX ADMIN — SODIUM CHLORIDE 1000 ML: 9 INJECTION, SOLUTION INTRAVENOUS at 19:16

## 2023-10-09 RX ADMIN — MORPHINE SULFATE 4 MG: 4 INJECTION, SOLUTION INTRAMUSCULAR; INTRAVENOUS at 19:16

## 2023-10-09 RX ADMIN — ONDANSETRON 4 MG: 2 INJECTION INTRAMUSCULAR; INTRAVENOUS at 19:14

## 2023-10-09 RX ADMIN — MAGNESIUM SULFATE IN DEXTROSE 1000 MG: 10 INJECTION, SOLUTION INTRAVENOUS at 21:21

## 2023-10-09 ASSESSMENT — PAIN DESCRIPTION - DESCRIPTORS: DESCRIPTORS: ACHING

## 2023-10-09 ASSESSMENT — PAIN DESCRIPTION - LOCATION: LOCATION: ABDOMEN

## 2023-10-09 ASSESSMENT — PAIN SCALES - GENERAL: PAINLEVEL_OUTOF10: 8

## 2023-10-09 ASSESSMENT — PAIN DESCRIPTION - ORIENTATION: ORIENTATION: LOWER

## 2023-10-09 NOTE — ED TRIAGE NOTES
Pt c/o nausea, diarrhea and lower abd pain since last night. Type 1 diabetic. BS reading 290s PTA.  Otherwise normal bs

## 2023-10-09 NOTE — ED PROVIDER NOTES
[facility] EMERGENCY DEPT  EMERGENCY DEPARTMENT ENCOUNTER       Pt Name: Magnus Victoria  MRN: [id number]  [address] [facility] 1980  Date of evaluation: 10/9/2023  Provider: CAMERON Reyna Mai   PCP: ALEXYS Anderson NP  Note Started: 6:25 PM EDT 10/9/23     CHIEF COMPLAINT       Chief Complaint   Patient presents with    Abdominal Pain        HISTORY OF PRESENT ILLNESS: 1 or more elements      History From: Patient  None     Magnus Victoria is a 37 y.o. female with a history of type 1 diabetes, per patient and record review, who presents to the ED for evaluation of abdominal pain, nausea and diarrhea since last night. States the pain is localized to the lower abdomen, otherwise does not radiate. States she has had nausea and a few intermittent episodes of diarrhea. Denies dark tarry or bright red bloody stools. Denies recent antibiotic use, abnormal ingestion, or recent travel. 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 States she did have one elevated blood sugar reading, 290, otherwise they have been stable. States she has had some urinary frequency, denies increased thirst.  Denies dysuria or hematuria. Denies fevers, SOB, CP, blood in urine or stool. Nursing Notes were all reviewed and agreed with or any disagreements were addressed in the HPI. REVIEW OF SYSTEMS      Review of Systems   All other systems reviewed and are negative. Positives and Pertinent negatives as per HPI.     PAST HISTORY     Past Medical History:  Past Medical History:   Diagnosis Date    Diabetes ([address])     type 1; endocrinologist at [facility]    Other ill-defined conditions(959.89)     DKA    PNA (pneumonia)     ; strep pneumococcus    Strep pharyngitis 3/13    with sepsis       Past Surgical History:  Past Surgical History:   Procedure Laterality Date    GYN          ORTHOPEDIC SURGERY      left foot surgery       Family History:  Family History   Problem Relation Age of

## 2023-10-09 NOTE — ED NOTES
Pregnancy waiver signed and is at bedside     [facility]  10/09/23 1949

## 2023-10-10 LAB
EKG ATRIAL RATE: 92 BPM
EKG DIAGNOSIS: NORMAL
EKG P AXIS: 68 DEGREES
EKG P-R INTERVAL: 146 MS
EKG Q-T INTERVAL: 374 MS
EKG QRS DURATION: 78 MS
EKG QTC CALCULATION (BAZETT): 462 MS
EKG R AXIS: 88 DEGREES
EKG T AXIS: 57 DEGREES
EKG VENTRICULAR RATE: 92 BPM

## 2023-10-10 NOTE — DISCHARGE INSTRUCTIONS
Thank You! It was a pleasure taking care of you in our Emergency Department today. We know that [facility] you are entrusting us with your health, comfort, and safety. Our clinicians honor that trust, and truly appreciate the opportunity to care for you and your loved ones. We also value your feedback. If you receive a survey about your Emergency Department experience today, please fill it out. We care about our patients' feedback, and we listen to what you have to say. Thank you. Mauricio Barrera PA-C    ____________________________________________________________________  I have included a copy of your lab results and/or radiologic studies from today's visit so you can have them easily available at your follow-up visit.    Recent Results (from the past 12 hour(s))   CBC with Auto Differential    Collection Time: 10/09/23  7:15 PM   Result Value Ref Range    WBC 10.4 3.6 - 11.0 K/uL    RBC 3.97 3.80 - 5.20 M/uL    Hemoglobin 12.4 11.5 - 16.0 g/dL    Hematocrit 36.6 35.0 - 47.0 %    MCV 92.2 80.0 - 99.0 FL    MCH 31.2 26.0 - 34.0 PG    MCHC 33.9 30.0 - 36.5 g/dL    RDW 13.8 11.5 - 14.5 %    Platelets 380 244 - 506 K/uL    MPV 10.8 8.9 - 12.9 FL    Nucleated RBCs 0.0 0  WBC    nRBC 0.00 0.00 - 0.01 K/uL    Neutrophils % 79 (H) 32 - 75 %    Lymphocytes % 12 12 - 49 %    Monocytes % 8 5 - 13 %    Eosinophils % 0 0 - 7 %    Basophils % 1 0 - 1 %    Immature Granulocytes 0 0.0 - 0.5 %    Neutrophils Absolute 8.2 (H) 1.8 - 8.0 K/UL    Lymphocytes Absolute 1.2 0.8 - 3.5 K/UL    Monocytes Absolute 0.8 0.0 - 1.0 K/UL    Eosinophils Absolute 0.0 0.0 - 0.4 K/UL    Basophils Absolute 0.1 0.0 - 0.1 K/UL    Absolute Immature Granulocyte 0.0 0.00 - 0.04 K/UL    Differential Type AUTOMATED     CMP    Collection Time: 10/09/23  7:15 PM   Result Value Ref Range    Sodium 133 (L) 136 - 145 mmol/L    Potassium 4.6 3.5 - 5.1 mmol/L    Chloride 98 97 - 108 mmol/L    CO2 28 21

## 2023-10-10 NOTE — ED NOTES
Discharge instructions given to patient by RN. Pt has been given counseling regarding at home treatment plan. Pt verbalizes understanding of need to seek further treatment if symptoms worsen. Pt ambulated off of unit in no signs of distress.        Roberto Gonzalez RN  10/09/23 8580

## 2023-10-25 ENCOUNTER — APPOINTMENT (OUTPATIENT)
Facility: HOSPITAL | Age: 43
End: 2023-10-25
Payer: MEDICAID

## 2023-10-25 ENCOUNTER — HOSPITAL ENCOUNTER (EMERGENCY)
Facility: HOSPITAL | Age: 43
Discharge: HOME | End: 2023-10-25
Payer: MEDICAID

## 2023-10-25 VITALS
TEMPERATURE: 98.6 F | BODY MASS INDEX: 26.89 KG/M2 | DIASTOLIC BLOOD PRESSURE: 89 MMHG | RESPIRATION RATE: 18 BRPM | WEIGHT: 157.5 LBS | HEIGHT: 64 IN | SYSTOLIC BLOOD PRESSURE: 142 MMHG | HEART RATE: 99 BPM | OXYGEN SATURATION: 99 %

## 2023-10-25 DIAGNOSIS — M62.838 TRAPEZIUS MUSCLE SPASM: ICD-10-CM

## 2023-10-25 DIAGNOSIS — M25.561 ACUTE PAIN OF RIGHT KNEE: ICD-10-CM

## 2023-10-25 DIAGNOSIS — S49.91XA INJURY OF RIGHT SHOULDER, INITIAL ENCOUNTER: Primary | ICD-10-CM

## 2023-10-25 PROCEDURE — 73030 X-RAY EXAM OF SHOULDER: CPT

## 2023-10-25 PROCEDURE — 73562 X-RAY EXAM OF KNEE 3: CPT

## 2023-10-25 PROCEDURE — 99283 EMERGENCY DEPT VISIT LOW MDM: CPT

## 2023-10-25 RX ORDER — NAPROXEN 500 MG/1
500 TABLET ORAL 2 TIMES DAILY
Qty: 20 TABLET | Refills: 0 | Status: SHIPPED | OUTPATIENT
Start: 2023-10-25

## 2023-10-25 RX ORDER — METHOCARBAMOL 750 MG/1
750 TABLET, FILM COATED ORAL EVERY 6 HOURS PRN
Qty: 20 TABLET | Refills: 0 | Status: SHIPPED | OUTPATIENT
Start: 2023-10-25 | End: 2023-10-30

## 2023-10-25 RX ORDER — LIDOCAINE 50 MG/G
1 PATCH TOPICAL DAILY PRN
Qty: 10 PATCH | Refills: 0 | Status: SHIPPED | OUTPATIENT
Start: 2023-10-25 | End: 2023-11-04

## 2023-10-25 ASSESSMENT — PAIN DESCRIPTION - DESCRIPTORS: DESCRIPTORS: THROBBING

## 2023-10-25 ASSESSMENT — PAIN DESCRIPTION - ORIENTATION: ORIENTATION: RIGHT

## 2023-10-25 ASSESSMENT — LIFESTYLE VARIABLES
HOW OFTEN DO YOU HAVE A DRINK CONTAINING ALCOHOL: 2-4 TIMES A MONTH
HOW MANY STANDARD DRINKS CONTAINING ALCOHOL DO YOU HAVE ON A TYPICAL DAY: 1 OR 2

## 2023-10-25 ASSESSMENT — PAIN SCALES - GENERAL: PAINLEVEL_OUTOF10: 8

## 2023-10-25 ASSESSMENT — PAIN DESCRIPTION - LOCATION: LOCATION: NECK;SHOULDER;LEG

## 2023-10-25 ASSESSMENT — PAIN - FUNCTIONAL ASSESSMENT: PAIN_FUNCTIONAL_ASSESSMENT: 0-10
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
## 2023-10-26 NOTE — ED NOTES

## 2023-10-26 NOTE — ED NOTES
Ace wrap applied to right knee. Pt educated on the use of ace wrap. Pt understood teaching by providing teach back.       Mahogany Rothman RN  10/25/23 2031

## 2023-12-15 RX ORDER — INSULIN ASPART 100 [IU]/ML
INJECTION, SOLUTION INTRAVENOUS; SUBCUTANEOUS
Qty: 15 ML | OUTPATIENT
Start: 2023-12-15

## 2024-03-28 RX ORDER — INSULIN ASPART 100 [IU]/ML
INJECTION, SOLUTION INTRAVENOUS; SUBCUTANEOUS
Qty: 15 ML | OUTPATIENT
Start: 2024-03-28

## 2024-08-20 ENCOUNTER — APPOINTMENT (OUTPATIENT)
Facility: HOSPITAL | Age: 44
End: 2024-08-20
Payer: MEDICAID

## 2024-08-20 ENCOUNTER — HOSPITAL ENCOUNTER (EMERGENCY)
Facility: HOSPITAL | Age: 44
Discharge: HOME | End: 2024-08-20
Payer: MEDICAID

## 2024-08-20 VITALS
OXYGEN SATURATION: 100 % | WEIGHT: 168 LBS | RESPIRATION RATE: 13 BRPM | DIASTOLIC BLOOD PRESSURE: 101 MMHG | BODY MASS INDEX: 28.68 KG/M2 | HEART RATE: 94 BPM | TEMPERATURE: 98 F | SYSTOLIC BLOOD PRESSURE: 169 MMHG | HEIGHT: 64 IN

## 2024-08-20 DIAGNOSIS — N76.0 BACTERIAL VAGINOSIS: Primary | ICD-10-CM

## 2024-08-20 DIAGNOSIS — B96.89 BACTERIAL VAGINOSIS: Primary | ICD-10-CM

## 2024-08-20 DIAGNOSIS — R10.84 GENERALIZED ABDOMINAL PAIN: ICD-10-CM

## 2024-08-20 LAB
ALBUMIN SERPL-MCNC: 3.4 G/DL (ref 3.5–5)
ALBUMIN/GLOB SERPL: 0.8 (ref 1.1–2.2)
ALP SERPL-CCNC: 72 U/L (ref 45–117)
ALT SERPL-CCNC: 19 U/L (ref 12–78)
ANION GAP SERPL CALC-SCNC: 6 MMOL/L (ref 5–15)
APPEARANCE UR: CLEAR
AST SERPL-CCNC: 19 U/L (ref 15–37)
BACTERIA URNS QL MICRO: NEGATIVE /HPF
BASOPHILS # BLD: 0.1 K/UL (ref 0–0.1)
BASOPHILS NFR BLD: 1 % (ref 0–1)
BILIRUB SERPL-MCNC: 0.6 MG/DL (ref 0.2–1)
BILIRUB UR QL: NEGATIVE
BUN SERPL-MCNC: 12 MG/DL (ref 6–20)
BUN/CREAT SERPL: 14 (ref 12–20)
CALCIUM SERPL-MCNC: 9.4 MG/DL (ref 8.5–10.1)
CHLORIDE SERPL-SCNC: 96 MMOL/L (ref 97–108)
CLUE CELLS VAG QL WET PREP: ABNORMAL
CO2 SERPL-SCNC: 30 MMOL/L (ref 21–32)
COLOR UR: ABNORMAL
CREAT SERPL-MCNC: 0.86 MG/DL (ref 0.55–1.02)
DIFFERENTIAL METHOD BLD: ABNORMAL
EOSINOPHIL # BLD: 0 K/UL (ref 0–0.4)
EOSINOPHIL NFR BLD: 0 % (ref 0–7)
EPITH CASTS URNS QL MICRO: ABNORMAL /LPF
ERYTHROCYTE [DISTWIDTH] IN BLOOD BY AUTOMATED COUNT: 14.6 % (ref 11.5–14.5)
GLOBULIN SER CALC-MCNC: 4.5 G/DL (ref 2–4)
GLUCOSE SERPL-MCNC: 195 MG/DL (ref 65–100)
GLUCOSE UR STRIP.AUTO-MCNC: NEGATIVE MG/DL
HCG UR QL: NEGATIVE
HCT VFR BLD AUTO: 35.5 % (ref 35–47)
HGB BLD-MCNC: 12.5 G/DL (ref 11.5–16)
HGB UR QL STRIP: NEGATIVE
IMM GRANULOCYTES # BLD AUTO: 0 K/UL (ref 0–0.04)
IMM GRANULOCYTES NFR BLD AUTO: 0 % (ref 0–0.5)
KETONES UR QL STRIP.AUTO: 15 MG/DL
LEUKOCYTE ESTERASE UR QL STRIP.AUTO: NEGATIVE
LIPASE SERPL-CCNC: 12 U/L (ref 13–75)
LYMPHOCYTES # BLD: 2.1 K/UL (ref 0.8–3.5)
LYMPHOCYTES NFR BLD: 19 % (ref 12–49)
MCH RBC QN AUTO: 30.9 PG (ref 26–34)
MCHC RBC AUTO-ENTMCNC: 35.2 G/DL (ref 30–36.5)
MCV RBC AUTO: 87.7 FL (ref 80–99)
MONOCYTES # BLD: 1 K/UL (ref 0–1)
MONOCYTES NFR BLD: 9 % (ref 5–13)
NEUTS SEG # BLD: 7.7 K/UL (ref 1.8–8)
NEUTS SEG NFR BLD: 71 % (ref 32–75)
NITRITE UR QL STRIP.AUTO: NEGATIVE
NRBC # BLD: 0 K/UL (ref 0–0.01)
NRBC BLD-RTO: 0 PER 100 WBC
PH UR STRIP: 5.5 (ref 5–8)
PLATELET # BLD AUTO: 278 K/UL (ref 150–400)
POTASSIUM SERPL-SCNC: 4.1 MMOL/L (ref 3.5–5.1)
PROT SERPL-MCNC: 7.9 G/DL (ref 6.4–8.2)
PROT UR STRIP-MCNC: 30 MG/DL
RBC # BLD AUTO: 4.05 M/UL (ref 3.8–5.2)
RBC #/AREA URNS HPF: ABNORMAL /HPF (ref 0–5)
RBC MORPH BLD: ABNORMAL
SODIUM SERPL-SCNC: 132 MMOL/L (ref 136–145)
SP GR UR REFRACTOMETRY: 1.02
T VAGINALIS VAG QL WET PREP: ABNORMAL
TROPONIN I SERPL HS-MCNC: 4 NG/L (ref 0–51)
URINE CULTURE IF INDICATED: ABNORMAL
UROBILINOGEN UR QL STRIP.AUTO: 1 EU/DL (ref 0.2–1)
WBC # BLD AUTO: 10.9 K/UL (ref 3.6–11)
WBC URNS QL MICRO: ABNORMAL /HPF (ref 0–4)
YEAST: ABNORMAL

## 2024-08-20 PROCEDURE — 80053 COMPREHEN METABOLIC PANEL: CPT

## 2024-08-20 PROCEDURE — 85025 COMPLETE CBC W/AUTO DIFF WBC: CPT

## 2024-08-20 PROCEDURE — 87591 N.GONORRHOEAE DNA AMP PROB: CPT

## 2024-08-20 PROCEDURE — 6360000004 HC RX CONTRAST MEDICATION

## 2024-08-20 PROCEDURE — 99285 EMERGENCY DEPT VISIT HI MDM: CPT

## 2024-08-20 PROCEDURE — 96375 TX/PRO/DX INJ NEW DRUG ADDON: CPT

## 2024-08-20 PROCEDURE — 84484 ASSAY OF TROPONIN QUANT: CPT

## 2024-08-20 PROCEDURE — 81001 URINALYSIS AUTO W/SCOPE: CPT

## 2024-08-20 PROCEDURE — 93005 ELECTROCARDIOGRAM TRACING: CPT

## 2024-08-20 PROCEDURE — 6360000002 HC RX W HCPCS

## 2024-08-20 PROCEDURE — 87210 SMEAR WET MOUNT SALINE/INK: CPT

## 2024-08-20 PROCEDURE — 74177 CT ABD & PELVIS W/CONTRAST: CPT

## 2024-08-20 PROCEDURE — 81025 URINE PREGNANCY TEST: CPT

## 2024-08-20 PROCEDURE — 83690 ASSAY OF LIPASE: CPT

## 2024-08-20 PROCEDURE — 6370000000 HC RX 637 (ALT 250 FOR IP)

## 2024-08-20 PROCEDURE — 96374 THER/PROPH/DIAG INJ IV PUSH: CPT

## 2024-08-20 PROCEDURE — 2580000003 HC RX 258

## 2024-08-20 PROCEDURE — 87491 CHLMYD TRACH DNA AMP PROBE: CPT

## 2024-08-20 RX ORDER — METRONIDAZOLE 7.5 MG/G
1 GEL VAGINAL DAILY
Qty: 70 G | Refills: 0 | Status: SHIPPED | OUTPATIENT
Start: 2024-08-20 | End: 2024-08-25

## 2024-08-20 RX ORDER — KETOROLAC TROMETHAMINE 30 MG/ML
15 INJECTION, SOLUTION INTRAMUSCULAR; INTRAVENOUS ONCE
Status: COMPLETED | OUTPATIENT
Start: 2024-08-20 | End: 2024-08-20

## 2024-08-20 RX ORDER — DICYCLOMINE HYDROCHLORIDE 10 MG/1
10 CAPSULE ORAL
Qty: 28 CAPSULE | Refills: 0 | Status: SHIPPED | OUTPATIENT
Start: 2024-08-20 | End: 2024-08-27

## 2024-08-20 RX ORDER — DICYCLOMINE HYDROCHLORIDE 10 MG/1
20 CAPSULE ORAL ONCE
Status: COMPLETED | OUTPATIENT
Start: 2024-08-20 | End: 2024-08-20

## 2024-08-20 RX ORDER — 0.9 % SODIUM CHLORIDE 0.9 %
1000 INTRAVENOUS SOLUTION INTRAVENOUS ONCE
Status: COMPLETED | OUTPATIENT
Start: 2024-08-20 | End: 2024-08-20

## 2024-08-20 RX ORDER — ONDANSETRON 2 MG/ML
4 INJECTION INTRAMUSCULAR; INTRAVENOUS ONCE
Status: COMPLETED | OUTPATIENT
Start: 2024-08-20 | End: 2024-08-20

## 2024-08-20 RX ORDER — ONDANSETRON 4 MG/1
4 TABLET, ORALLY DISINTEGRATING ORAL 3 TIMES DAILY PRN
Qty: 21 TABLET | Refills: 0 | Status: SHIPPED | OUTPATIENT
Start: 2024-08-20

## 2024-08-20 RX ADMIN — SODIUM CHLORIDE 1000 ML: 9 INJECTION, SOLUTION INTRAVENOUS at 17:37

## 2024-08-20 RX ADMIN — KETOROLAC TROMETHAMINE 15 MG: 30 INJECTION, SOLUTION INTRAMUSCULAR at 17:38

## 2024-08-20 RX ADMIN — DICYCLOMINE HYDROCHLORIDE 20 MG: 10 CAPSULE ORAL at 18:41

## 2024-08-20 RX ADMIN — ONDANSETRON 4 MG: 2 INJECTION INTRAMUSCULAR; INTRAVENOUS at 17:38

## 2024-08-20 RX ADMIN — IOPAMIDOL 100 ML: 755 INJECTION, SOLUTION INTRAVENOUS at 19:01

## 2024-08-20 ASSESSMENT — PAIN SCALES - GENERAL
PAINLEVEL_OUTOF10: 9
PAINLEVEL_OUTOF10: 8
PAINLEVEL_OUTOF10: 8

## 2024-08-20 ASSESSMENT — PAIN DESCRIPTION - LOCATION
LOCATION: ABDOMEN

## 2024-08-20 ASSESSMENT — PAIN - FUNCTIONAL ASSESSMENT: PAIN_FUNCTIONAL_ASSESSMENT: 0-10

## 2024-08-20 ASSESSMENT — PAIN DESCRIPTION - ORIENTATION: ORIENTATION: LEFT;RIGHT;LOWER

## 2024-08-20 ASSESSMENT — PAIN DESCRIPTION - DESCRIPTORS: DESCRIPTORS: ACHING

## 2024-08-20 NOTE — ED PROVIDER NOTES
[facility] EMERGENCY DEPT  EMERGENCY DEPARTMENT ENCOUNTER       Pt Name: Estefania Champion  MRN: [id number]  Birthdate 1980  Date of evaluation: 2024  Provider: Chelle Mirza PA-C   PCP: Rocío Daniels APRN - NP  Note Started: 5:11 PM EDT 24     CHIEF COMPLAINT       Chief Complaint   Patient presents with    Abdominal Pain        HISTORY OF PRESENT ILLNESS: 1 or more elements      History From: Patient  HPI Limitations: None     Estefania Champion is a 43 y.o. female who presents amatory to the emergency department for evaluation of lower abdominal pain x 2 days.  Reports this morning she had radiation to the back bilaterally.  Reports diarrhea, denies vomiting or known fevers at home.  Reports abdomen is tender to the touch, history of  section, denies additional abdominal surgeries.     Nursing Notes were all reviewed and agreed with or any disagreements were addressed in the HPI.     REVIEW OF SYSTEMS      Review of Systems     Positives and Pertinent negatives as per HPI.    PAST HISTORY     Past Medical History:  Past Medical History:   Diagnosis Date    Diabetes (HCC)     [facility]    Other ill-defined conditions(799.89)     DKA    PNA (pneumonia)     ; strep pneumococcus    Strep pharyngitis 3/13    with sepsis       Past Surgical History:  Past Surgical History:   Procedure Laterality Date    GYN          ORTHOPEDIC SURGERY      left foot surgery       Family History:  Family History   Problem Relation Age of Onset    Cancer Father     Diabetes Mother     Heart Disease Mother        Social History:  Social History     Tobacco Use    Smoking status: Every Day     Current packs/day: 0.25     Types: Cigarettes    Smokeless tobacco: Never   Substance Use Topics    Alcohol use: Yes    Drug use: No       Allergies:  No Known Allergies    CURRENT MEDICATIONS      Previous Medications    DICYCLOMINE (BENTYL) 20 MG TABLET    Take 1 tablet by mouth 4 times daily

## 2024-08-20 NOTE — ED TRIAGE NOTES
Pt reports abdominal pain x 2 days that is tender to the touch, worse when laying down, and today began to radiate to bilateral flank/back

## 2024-08-20 NOTE — ED NOTES
Pt presents to ED complaining of abd pain x2 days. Pt states she has lower abd pain that is sensitive to touch and has radiated to her back since yesterday. Pt reports hx of DM and HTN. Pt denies taking OTC medications PTA. Pt is alert and oriented x 4, RR even and unlabored, skin is warm and dry. Pt appears in NAD at this time. Assessment completed and pt updated on plan of care.  Call bell in reach.   Emergency Department Nursing Plan of Care  The Nursing Plan of Care is developed from the Nursing assessment and Emergency Department Attending provider initial evaluation.  The plan of care may be reviewed in the “ED Provider note”.  The Plan of Care was developed with the following considerations:  Patient / Family readiness to learn indicated by:Refer to Medical chart in [facility]  Persons(s) to be included in education: Refer to Medical chart in [facility]  Barriers to Learning/Limitations:Normal

## 2024-08-20 NOTE — ED NOTES
Verbal shift change report given to FIFI Isidro (oncoming nurse) by FIFI Hua (offgoing nurse). Report included the following information Nurse Handoff Report.

## 2024-08-21 LAB
C TRACH DNA SPEC QL NAA+PROBE: NEGATIVE
EKG ATRIAL RATE: 92 BPM
EKG DIAGNOSIS: NORMAL
EKG P AXIS: 64 DEGREES
EKG P-R INTERVAL: 144 MS
EKG Q-T INTERVAL: 364 MS
EKG QRS DURATION: 82 MS
EKG QTC CALCULATION (BAZETT): 450 MS
EKG R AXIS: 94 DEGREES
EKG T AXIS: 70 DEGREES
EKG VENTRICULAR RATE: 92 BPM
N GONORRHOEA DNA SPEC QL NAA+PROBE: NEGATIVE
SAMPLE TYPE: NORMAL
SERVICE CMNT-IMP: NORMAL
SPECIMEN SOURCE: NORMAL

## 2024-08-21 PROCEDURE — 93010 ELECTROCARDIOGRAM REPORT: CPT | Performed by: SPECIALIST

## 2024-09-16 ENCOUNTER — HOSPITAL ENCOUNTER (EMERGENCY)
Facility: HOSPITAL | Age: 44
Discharge: HOME | End: 2024-09-16
Attending: STUDENT IN AN ORGANIZED HEALTH CARE EDUCATION/TRAINING PROGRAM
Payer: MEDICAID

## 2024-09-16 ENCOUNTER — APPOINTMENT (OUTPATIENT)
Facility: HOSPITAL | Age: 44
End: 2024-09-16
Payer: MEDICAID

## 2024-09-16 VITALS
OXYGEN SATURATION: 100 % | RESPIRATION RATE: 16 BRPM | WEIGHT: 167 LBS | HEIGHT: 64 IN | HEART RATE: 90 BPM | SYSTOLIC BLOOD PRESSURE: 133 MMHG | TEMPERATURE: 98.2 F | DIASTOLIC BLOOD PRESSURE: 86 MMHG | BODY MASS INDEX: 28.51 KG/M2

## 2024-09-16 DIAGNOSIS — R07.9 CHEST PAIN, UNSPECIFIED TYPE: Primary | ICD-10-CM

## 2024-09-16 DIAGNOSIS — R73.9 HYPERGLYCEMIA: ICD-10-CM

## 2024-09-16 LAB
ALBUMIN SERPL-MCNC: 3.4 G/DL (ref 3.5–5)
ALBUMIN/GLOB SERPL: 0.7 (ref 1.1–2.2)
ALP SERPL-CCNC: 79 U/L (ref 45–117)
ALT SERPL-CCNC: 26 U/L (ref 12–78)
ANION GAP SERPL CALC-SCNC: 8 MMOL/L (ref 2–12)
AST SERPL-CCNC: 22 U/L (ref 15–37)
BASOPHILS # BLD: 0.1 K/UL (ref 0–0.1)
BASOPHILS NFR BLD: 1 % (ref 0–1)
BILIRUB SERPL-MCNC: 0.5 MG/DL (ref 0.2–1)
BUN SERPL-MCNC: 17 MG/DL (ref 6–20)
BUN/CREAT SERPL: 20 (ref 12–20)
CALCIUM SERPL-MCNC: 9.1 MG/DL (ref 8.5–10.1)
CHLORIDE SERPL-SCNC: 98 MMOL/L (ref 97–108)
CO2 SERPL-SCNC: 30 MMOL/L (ref 21–32)
CREAT SERPL-MCNC: 0.84 MG/DL (ref 0.55–1.02)
D DIMER PPP FEU-MCNC: 0.23 MG/L FEU (ref 0–0.65)
DIFFERENTIAL METHOD BLD: ABNORMAL
EOSINOPHIL # BLD: 0 K/UL (ref 0–0.4)
EOSINOPHIL NFR BLD: 0 % (ref 0–7)
ERYTHROCYTE [DISTWIDTH] IN BLOOD BY AUTOMATED COUNT: 14.6 % (ref 11.5–14.5)
GLOBULIN SER CALC-MCNC: 5 G/DL (ref 2–4)
GLUCOSE BLD STRIP.AUTO-MCNC: 144 MG/DL (ref 65–117)
GLUCOSE SERPL-MCNC: 117 MG/DL (ref 65–100)
HCT VFR BLD AUTO: 38.9 % (ref 35–47)
HGB BLD-MCNC: 12.9 G/DL (ref 11.5–16)
IMM GRANULOCYTES # BLD AUTO: 0 K/UL (ref 0–0.04)
IMM GRANULOCYTES NFR BLD AUTO: 0 % (ref 0–0.5)
LYMPHOCYTES # BLD: 1.9 K/UL (ref 0.8–3.5)
LYMPHOCYTES NFR BLD: 19 % (ref 12–49)
MCH RBC QN AUTO: 30.1 PG (ref 26–34)
MCHC RBC AUTO-ENTMCNC: 33.2 G/DL (ref 30–36.5)
MCV RBC AUTO: 90.7 FL (ref 80–99)
MONOCYTES # BLD: 0.9 K/UL (ref 0–1)
MONOCYTES NFR BLD: 9 % (ref 5–13)
NEUTS SEG # BLD: 7.3 K/UL (ref 1.8–8)
NEUTS SEG NFR BLD: 71 % (ref 32–75)
NRBC # BLD: 0 K/UL (ref 0–0.01)
NRBC BLD-RTO: 0 PER 100 WBC
PLATELET # BLD AUTO: 255 K/UL (ref 150–400)
PMV BLD AUTO: 10.4 FL (ref 8.9–12.9)
POTASSIUM SERPL-SCNC: 3.9 MMOL/L (ref 3.5–5.1)
PROT SERPL-MCNC: 8.4 G/DL (ref 6.4–8.2)
RBC # BLD AUTO: 4.29 M/UL (ref 3.8–5.2)
SERVICE CMNT-IMP: ABNORMAL
SODIUM SERPL-SCNC: 136 MMOL/L (ref 136–145)
TROPONIN I SERPL HS-MCNC: <4 NG/L (ref 0–51)
WBC # BLD AUTO: 10.2 K/UL (ref 3.6–11)

## 2024-09-16 PROCEDURE — 93005 ELECTROCARDIOGRAM TRACING: CPT | Performed by: STUDENT IN AN ORGANIZED HEALTH CARE EDUCATION/TRAINING PROGRAM

## 2024-09-16 PROCEDURE — 99285 EMERGENCY DEPT VISIT HI MDM: CPT

## 2024-09-16 PROCEDURE — 80053 COMPREHEN METABOLIC PANEL: CPT

## 2024-09-16 PROCEDURE — 36415 COLL VENOUS BLD VENIPUNCTURE: CPT

## 2024-09-16 PROCEDURE — 82962 GLUCOSE BLOOD TEST: CPT

## 2024-09-16 PROCEDURE — 84484 ASSAY OF TROPONIN QUANT: CPT

## 2024-09-16 PROCEDURE — 71045 X-RAY EXAM CHEST 1 VIEW: CPT

## 2024-09-16 PROCEDURE — 85379 FIBRIN DEGRADATION QUANT: CPT

## 2024-09-16 PROCEDURE — 2580000003 HC RX 258: Performed by: STUDENT IN AN ORGANIZED HEALTH CARE EDUCATION/TRAINING PROGRAM

## 2024-09-16 PROCEDURE — 85025 COMPLETE CBC W/AUTO DIFF WBC: CPT

## 2024-09-16 PROCEDURE — 96360 HYDRATION IV INFUSION INIT: CPT

## 2024-09-16 RX ORDER — SODIUM CHLORIDE, SODIUM LACTATE, POTASSIUM CHLORIDE, AND CALCIUM CHLORIDE .6; .31; .03; .02 G/100ML; G/100ML; G/100ML; G/100ML
1000 INJECTION, SOLUTION INTRAVENOUS ONCE
Status: COMPLETED | OUTPATIENT
Start: 2024-09-16 | End: 2024-09-16

## 2024-09-16 RX ADMIN — SODIUM CHLORIDE, POTASSIUM CHLORIDE, SODIUM LACTATE AND CALCIUM CHLORIDE 1000 ML: 600; 310; 30; 20 INJECTION, SOLUTION INTRAVENOUS at 19:37

## 2024-09-16 ASSESSMENT — PAIN - FUNCTIONAL ASSESSMENT
PAIN_FUNCTIONAL_ASSESSMENT: 0-10
PAIN_FUNCTIONAL_ASSESSMENT: 0-10

## 2024-09-16 ASSESSMENT — PAIN DESCRIPTION - PAIN TYPE: TYPE: ACUTE PAIN

## 2024-09-16 ASSESSMENT — LIFESTYLE VARIABLES
HOW MANY STANDARD DRINKS CONTAINING ALCOHOL DO YOU HAVE ON A TYPICAL DAY: 1 OR 2
HOW OFTEN DO YOU HAVE A DRINK CONTAINING ALCOHOL: 2-4 TIMES A MONTH

## 2024-09-16 ASSESSMENT — PAIN SCALES - GENERAL
PAINLEVEL_OUTOF10: 6
PAINLEVEL_OUTOF10: 0

## 2024-09-16 ASSESSMENT — PAIN DESCRIPTION - LOCATION: LOCATION: CHEST

## 2024-09-16 ASSESSMENT — PAIN DESCRIPTION - DESCRIPTORS: DESCRIPTORS: PRESSURE

## 2024-09-17 LAB
EKG ATRIAL RATE: 105 BPM
EKG DIAGNOSIS: NORMAL
EKG P AXIS: 71 DEGREES
EKG P-R INTERVAL: 132 MS
EKG Q-T INTERVAL: 342 MS
EKG QRS DURATION: 80 MS
EKG QTC CALCULATION (BAZETT): 452 MS
EKG R AXIS: 81 DEGREES
EKG T AXIS: 67 DEGREES
EKG VENTRICULAR RATE: 105 BPM

## 2024-09-17 PROCEDURE — 93010 ELECTROCARDIOGRAM REPORT: CPT | Performed by: SPECIALIST

## 2025-01-05 ENCOUNTER — HOSPITAL ENCOUNTER (EMERGENCY)
Facility: HOSPITAL | Age: 45
Discharge: HOME | End: 2025-01-05
Attending: EMERGENCY MEDICINE
Payer: MEDICAID

## 2025-01-05 VITALS
HEART RATE: 103 BPM | OXYGEN SATURATION: 99 % | HEIGHT: 64 IN | BODY MASS INDEX: 28.94 KG/M2 | SYSTOLIC BLOOD PRESSURE: 154 MMHG | DIASTOLIC BLOOD PRESSURE: 82 MMHG | TEMPERATURE: 98.3 F | RESPIRATION RATE: 16 BRPM | WEIGHT: 169.5 LBS

## 2025-01-05 DIAGNOSIS — N73.0 PID (ACUTE PELVIC INFLAMMATORY DISEASE): Primary | ICD-10-CM

## 2025-01-05 LAB
APPEARANCE UR: CLEAR
BACTERIA URNS QL MICRO: NEGATIVE /HPF
BILIRUB UR QL: NEGATIVE
CLUE CELLS VAG QL WET PREP: NORMAL
COLOR UR: NORMAL
EPITH CASTS URNS QL MICRO: NORMAL /LPF
GLUCOSE UR STRIP.AUTO-MCNC: NEGATIVE MG/DL
HCG UR QL: NEGATIVE
HGB UR QL STRIP: NEGATIVE
KETONES UR QL STRIP.AUTO: NEGATIVE MG/DL
LEUKOCYTE ESTERASE UR QL STRIP.AUTO: NEGATIVE
NITRITE UR QL STRIP.AUTO: NEGATIVE
PH UR STRIP: 5 (ref 5–8)
PROT UR STRIP-MCNC: NEGATIVE MG/DL
RBC #/AREA URNS HPF: NORMAL /HPF (ref 0–5)
SP GR UR REFRACTOMETRY: <1.005 (ref 1–1.03)
T VAGINALIS VAG QL WET PREP: NORMAL
URINE CULTURE IF INDICATED: NORMAL
UROBILINOGEN UR QL STRIP.AUTO: 0.2 EU/DL (ref 0.2–1)
WBC URNS QL MICRO: NORMAL /HPF (ref 0–4)
YEAST: NORMAL

## 2025-01-05 PROCEDURE — 87210 SMEAR WET MOUNT SALINE/INK: CPT

## 2025-01-05 PROCEDURE — 6370000000 HC RX 637 (ALT 250 FOR IP): Performed by: EMERGENCY MEDICINE

## 2025-01-05 PROCEDURE — 87491 CHLMYD TRACH DNA AMP PROBE: CPT

## 2025-01-05 PROCEDURE — 99284 EMERGENCY DEPT VISIT MOD MDM: CPT

## 2025-01-05 PROCEDURE — 81025 URINE PREGNANCY TEST: CPT

## 2025-01-05 PROCEDURE — 6360000002 HC RX W HCPCS: Performed by: EMERGENCY MEDICINE

## 2025-01-05 PROCEDURE — 81001 URINALYSIS AUTO W/SCOPE: CPT

## 2025-01-05 PROCEDURE — 87591 N.GONORRHOEAE DNA AMP PROB: CPT

## 2025-01-05 PROCEDURE — 96372 THER/PROPH/DIAG INJ SC/IM: CPT

## 2025-01-05 RX ORDER — DOXYCYCLINE HYCLATE 100 MG
100 TABLET ORAL 2 TIMES DAILY
Qty: 20 TABLET | Refills: 0 | Status: SHIPPED | OUTPATIENT
Start: 2025-01-05 | End: 2025-01-15

## 2025-01-05 RX ORDER — DOXYCYCLINE HYCLATE 100 MG
100 TABLET ORAL
Status: COMPLETED | OUTPATIENT
Start: 2025-01-05 | End: 2025-01-05

## 2025-01-05 RX ADMIN — LIDOCAINE HYDROCHLORIDE 500 MG: 10 INJECTION, SOLUTION EPIDURAL; INFILTRATION; INTRACAUDAL; PERINEURAL at 03:25

## 2025-01-05 RX ADMIN — DOXYCYCLINE HYCLATE 100 MG: 100 TABLET, FILM COATED ORAL at 03:25

## 2025-01-05 ASSESSMENT — PAIN DESCRIPTION - LOCATION
LOCATION_2: ABDOMEN
LOCATION: VAGINA

## 2025-01-05 ASSESSMENT — PAIN SCALES - GENERAL: PAINLEVEL_OUTOF10: 8

## 2025-01-05 ASSESSMENT — PAIN - FUNCTIONAL ASSESSMENT: PAIN_FUNCTIONAL_ASSESSMENT: 0-10

## 2025-01-05 ASSESSMENT — PAIN DESCRIPTION - INTENSITY: RATING_2: 6

## 2025-01-05 ASSESSMENT — PAIN DESCRIPTION - DESCRIPTORS
DESCRIPTORS_2: DISCOMFORT
DESCRIPTORS: ACHING

## 2025-01-05 ASSESSMENT — PAIN DESCRIPTION - ORIENTATION: ORIENTATION_2: LOWER

## 2025-01-05 NOTE — ED PROVIDER NOTES
[facility] EMERGENCY DEPT  EMERGENCY DEPARTMENT ENCOUNTER       Pt Name: Estefania Champion  MRN: [id number]  Birthdate 1980  Date of evaluation: 2025  Provider: Elier Lara MD   PCP: Rocío Daniels APRN - NP  Note Started: 3:21 AM 25     CHIEF COMPLAINT       Chief Complaint   Patient presents with    Vaginal Pain    Vaginal Discharge        HISTORY OF PRESENT ILLNESS: 1 or more elements      History From: Patient, History limited by: No limitations     Estefania Champion is a 44 y.o. female who presents with chief complaint of vaginal pain, pelvic pain, vaginal discharge.  Symptoms have been present over the past 5 days, progressively worsening.  Denies fevers, chills, nausea, vomiting.  She is sexually active.     Nursing Notes were all reviewed and agreed with or any disagreements were addressed in the HPI.     REVIEW OF SYSTEMS        Positives and Pertinent negatives as per HPI.    PAST HISTORY     Past Medical History:  Past Medical History:   Diagnosis Date    Diabetes (HCC)     type 1[facility]    Other ill-defined conditions(799.89)     DKA    PNA (pneumonia)     ; strep pneumococcus    Strep pharyngitis 3/13    with sepsis       Past Surgical History:  Past Surgical History:   Procedure Laterality Date    GYN          ORTHOPEDIC SURGERY      left foot surgery       Family History:  Family History   Problem Relation Age of Onset    Cancer Father     Diabetes Mother     Heart Disease Mother        Social History:  Social History     Tobacco Use    Smoking status: Every Day     Current packs/day: 0.25     Types: Cigarettes    Smokeless tobacco: Never   Substance Use Topics    Alcohol use: Yes    Drug use: No       Allergies:  No Known Allergies    CURRENT MEDICATIONS      Previous Medications    DICYCLOMINE (BENTYL) 10 MG CAPSULE    Take 1 capsule by mouth 4 times daily (before meals and nightly) for 7 days    DICYCLOMINE (BENTYL) 20 MG TABLET    Take 1 tablet by mouth 4 times

## 2025-01-05 NOTE — ED NOTES
Pt presents ambulatory to ED complaining of vaginal discharge. Pt is alert and oriented x 4, RR even and unlabored, skin is warm and dry. Assesment completed and pt updated on plan of care.       Emergency Department Nursing Plan of Care       The Nursing Plan of Care is developed from the Nursing assessment and Emergency Department Attending provider initial evaluation.  The plan of care may be reviewed in the “ED Provider note”.    The Plan of Care was developed with the following considerations:   Patient / Family readiness to learn indicated by:verbalized understanding  Persons(s) to be included in education: patient  Barriers to Learning/Limitations:None    Signed     Jacek Acosta RN    1/5/2025   3:18 AM

## 2025-01-05 NOTE — ED NOTES
Discharge instructions were given to the patient by RN.     The patient left the Emergency Department alert and oriented and in no acute distress with 1 prescriptions. The patient was encouraged to call or return to the ED for worsening issues or problems and was encouraged to schedule a follow up appointment for continuing care.     Ambulation assessment completed before discharge.  Pt left Emergency Department ambulating at baseline with no ortho devices  Ortho device education: none    The patient verbalized understanding of discharge instructions and prescriptions, all questions were answered. The patient has no further concerns at this time.

## 2025-01-05 NOTE — ED TRIAGE NOTES
Pt presents to ED with c/o vaginal pain, itching, & thick, white discharge for 3-4 days. Pt also reports lower abd pain & urinary frequency. Pt denies vaginal bleeding, denies meds PTA. Pt reports hx of DM.

## 2025-01-06 LAB
C TRACH DNA SPEC QL NAA+PROBE: NEGATIVE
N GONORRHOEA DNA SPEC QL NAA+PROBE: NEGATIVE
SAMPLE TYPE: NORMAL
SERVICE CMNT-IMP: NORMAL
SPECIMEN SOURCE: NORMAL

## 2025-03-25 ENCOUNTER — APPOINTMENT (OUTPATIENT)
Facility: HOSPITAL | Age: 45
End: 2025-03-25
Payer: MEDICAID

## 2025-03-25 ENCOUNTER — HOSPITAL ENCOUNTER (EMERGENCY)
Facility: HOSPITAL | Age: 45
Discharge: HOME | End: 2025-03-25
Payer: MEDICAID

## 2025-03-25 VITALS
DIASTOLIC BLOOD PRESSURE: 81 MMHG | OXYGEN SATURATION: 99 % | WEIGHT: 170 LBS | RESPIRATION RATE: 18 BRPM | HEIGHT: 64 IN | BODY MASS INDEX: 29.02 KG/M2 | HEART RATE: 88 BPM | TEMPERATURE: 98.6 F | SYSTOLIC BLOOD PRESSURE: 128 MMHG

## 2025-03-25 DIAGNOSIS — B34.9 VIRAL ILLNESS: Primary | ICD-10-CM

## 2025-03-25 DIAGNOSIS — R52 BODY ACHES: ICD-10-CM

## 2025-03-25 LAB
ANION GAP SERPL CALC-SCNC: 6 MMOL/L (ref 2–12)
APPEARANCE UR: CLEAR
BACTERIA URNS QL MICRO: NEGATIVE /HPF
BASOPHILS # BLD: 0 K/UL (ref 0–0.1)
BASOPHILS NFR BLD: 0 % (ref 0–1)
BILIRUB UR QL: NEGATIVE
BUN SERPL-MCNC: 15 MG/DL (ref 6–20)
BUN/CREAT SERPL: 18 (ref 12–20)
CALCIUM SERPL-MCNC: 8.8 MG/DL (ref 8.5–10.1)
CHLORIDE SERPL-SCNC: 99 MMOL/L (ref 97–108)
CO2 SERPL-SCNC: 28 MMOL/L (ref 21–32)
COLOR UR: ABNORMAL
CREAT SERPL-MCNC: 0.82 MG/DL (ref 0.55–1.02)
DIFFERENTIAL METHOD BLD: NORMAL
EOSINOPHIL # BLD: 0 K/UL (ref 0–0.4)
EOSINOPHIL NFR BLD: 0 % (ref 0–7)
EPITH CASTS URNS QL MICRO: ABNORMAL /LPF
ERYTHROCYTE [DISTWIDTH] IN BLOOD BY AUTOMATED COUNT: 14 % (ref 11.5–14.5)
FLUAV RNA SPEC QL NAA+PROBE: NOT DETECTED
FLUBV RNA SPEC QL NAA+PROBE: NOT DETECTED
GLUCOSE SERPL-MCNC: 224 MG/DL (ref 65–100)
GLUCOSE UR STRIP.AUTO-MCNC: 100 MG/DL
HCG UR QL: NEGATIVE
HCT VFR BLD AUTO: 35.4 % (ref 35–47)
HGB BLD-MCNC: 12.1 G/DL (ref 11.5–16)
HGB UR QL STRIP: NEGATIVE
IMM GRANULOCYTES # BLD AUTO: 0 K/UL
IMM GRANULOCYTES NFR BLD AUTO: 0 %
KETONES UR QL STRIP.AUTO: NEGATIVE MG/DL
LEUKOCYTE ESTERASE UR QL STRIP.AUTO: NEGATIVE
LYMPHOCYTES # BLD: 2.16 K/UL (ref 0.8–3.5)
LYMPHOCYTES NFR BLD: 22 % (ref 12–49)
MCH RBC QN AUTO: 30.4 PG (ref 26–34)
MCHC RBC AUTO-ENTMCNC: 34.2 G/DL (ref 30–36.5)
MCV RBC AUTO: 88.9 FL (ref 80–99)
MONOCYTES # BLD: 0.88 K/UL (ref 0–1)
MONOCYTES NFR BLD: 9 % (ref 5–13)
NEUTS SEG # BLD: 6.76 K/UL (ref 1.8–8)
NEUTS SEG NFR BLD: 69 % (ref 32–75)
NITRITE UR QL STRIP.AUTO: NEGATIVE
NRBC # BLD: 0 K/UL (ref 0–0.01)
NRBC BLD-RTO: 0 PER 100 WBC
PH UR STRIP: 5.5 (ref 5–8)
PLATELET # BLD AUTO: 273 K/UL (ref 150–400)
PMV BLD AUTO: 10.9 FL (ref 8.9–12.9)
POTASSIUM SERPL-SCNC: 3.8 MMOL/L (ref 3.5–5.1)
PROT UR STRIP-MCNC: NEGATIVE MG/DL
RBC # BLD AUTO: 3.98 M/UL (ref 3.8–5.2)
RBC #/AREA URNS HPF: ABNORMAL /HPF (ref 0–5)
RBC MORPH BLD: NORMAL
SARS-COV-2 RNA RESP QL NAA+PROBE: NOT DETECTED
SODIUM SERPL-SCNC: 133 MMOL/L (ref 136–145)
SOURCE: NORMAL
SP GR UR REFRACTOMETRY: 1.02
TROPONIN I SERPL HS-MCNC: 5 NG/L (ref 0–51)
URINE CULTURE IF INDICATED: ABNORMAL
UROBILINOGEN UR QL STRIP.AUTO: 0.2 EU/DL (ref 0.2–1)
WBC # BLD AUTO: 9.8 K/UL (ref 3.6–11)
WBC URNS QL MICRO: ABNORMAL /HPF (ref 0–4)

## 2025-03-25 PROCEDURE — 85025 COMPLETE CBC W/AUTO DIFF WBC: CPT

## 2025-03-25 PROCEDURE — 93005 ELECTROCARDIOGRAM TRACING: CPT

## 2025-03-25 PROCEDURE — 81001 URINALYSIS AUTO W/SCOPE: CPT

## 2025-03-25 PROCEDURE — 6370000000 HC RX 637 (ALT 250 FOR IP)

## 2025-03-25 PROCEDURE — 84484 ASSAY OF TROPONIN QUANT: CPT

## 2025-03-25 PROCEDURE — 80048 BASIC METABOLIC PNL TOTAL CA: CPT

## 2025-03-25 PROCEDURE — 99284 EMERGENCY DEPT VISIT MOD MDM: CPT

## 2025-03-25 PROCEDURE — 81025 URINE PREGNANCY TEST: CPT

## 2025-03-25 PROCEDURE — 36415 COLL VENOUS BLD VENIPUNCTURE: CPT

## 2025-03-25 PROCEDURE — 87636 SARSCOV2 & INF A&B AMP PRB: CPT

## 2025-03-25 RX ORDER — ACETAMINOPHEN 500 MG
1000 TABLET ORAL
Status: COMPLETED | OUTPATIENT
Start: 2025-03-25 | End: 2025-03-25

## 2025-03-25 RX ORDER — ACETAMINOPHEN 500 MG
500 TABLET ORAL EVERY 6 HOURS PRN
Qty: 28 TABLET | Refills: 0 | Status: SHIPPED | OUTPATIENT
Start: 2025-03-25 | End: 2025-04-01

## 2025-03-25 RX ORDER — IBUPROFEN 600 MG/1
600 TABLET, FILM COATED ORAL
Status: COMPLETED | OUTPATIENT
Start: 2025-03-25 | End: 2025-03-25

## 2025-03-25 RX ORDER — IBUPROFEN 600 MG/1
600 TABLET, FILM COATED ORAL 3 TIMES DAILY PRN
Qty: 30 TABLET | Refills: 0 | Status: SHIPPED | OUTPATIENT
Start: 2025-03-25

## 2025-03-25 RX ADMIN — IBUPROFEN 600 MG: 600 TABLET, FILM COATED ORAL at 17:32

## 2025-03-25 RX ADMIN — ACETAMINOPHEN 1000 MG: 500 TABLET ORAL at 19:43

## 2025-03-25 ASSESSMENT — PAIN DESCRIPTION - LOCATION: LOCATION: GENERALIZED

## 2025-03-25 ASSESSMENT — PAIN SCALES - GENERAL
PAINLEVEL_OUTOF10: 10
PAINLEVEL_OUTOF10: 8

## 2025-03-25 ASSESSMENT — PAIN DESCRIPTION - DESCRIPTORS: DESCRIPTORS: ACHING

## 2025-03-25 ASSESSMENT — HEART SCORE: ECG: NORMAL

## 2025-03-25 ASSESSMENT — PAIN - FUNCTIONAL ASSESSMENT: PAIN_FUNCTIONAL_ASSESSMENT: 0-10

## 2025-03-25 NOTE — ED TRIAGE NOTES
Pt arrives ambulatory with cc of generalized body pain. Pt states she has pain in her neck and chest as well.  Pt states she feels like her extremities are tingling, states she feels the tingling comes and goes but the pain stays.

## 2025-03-25 NOTE — ED NOTES
Pt presents ambulatory to ED complaining of body aches and tingling in hands and feet since sunday. Pt denies sick exposure. Pt reports hx of DM. Pt is alert and oriented x 4, RR even and unlabored, skin is warm and dry. Assesment completed and pt updated on plan of care.       Emergency Department Nursing Plan of Care       The Nursing Plan of Care is developed from the Nursing assessment and Emergency Department Attending provider initial evaluation.  The plan of care may be reviewed in the “ED Provider note”.    The Plan of Care was developed with the following considerations:   Patient / Family readiness to learn indicated by:verbalized understanding  Persons(s) to be included in education: patient  Barriers to Learning/Limitations:None    Signed     Sanna Eastman RN    3/25/2025   5:58 PM

## 2025-03-26 LAB
EKG ATRIAL RATE: 85 BPM
EKG DIAGNOSIS: NORMAL
EKG P AXIS: 66 DEGREES
EKG P-R INTERVAL: 142 MS
EKG Q-T INTERVAL: 366 MS
EKG QRS DURATION: 82 MS
EKG QTC CALCULATION (BAZETT): 435 MS
EKG R AXIS: 87 DEGREES
EKG T AXIS: 62 DEGREES
EKG VENTRICULAR RATE: 85 BPM

## 2025-03-26 PROCEDURE — 93010 ELECTROCARDIOGRAM REPORT: CPT | Performed by: SPECIALIST

## 2025-03-26 NOTE — ED NOTES
Discharge instructions were given to the patient by adrián phan.     The patient left the Emergency Department alert and oriented and in no acute distress with 2 prescriptions. The patient was encouraged to call or return to the ED for worsening issues or problems and was encouraged to schedule a follow up appointment for continuing care.     Ambulation assessment completed before discharge.  Pt left Emergency Department ambulating at baseline with no ortho devices  Ortho device education: none    The patient verbalized understanding of discharge instructions and prescriptions, all questions were answered. The patient has no further concerns at this time.

## 2025-05-15 ENCOUNTER — HOSPITAL ENCOUNTER (EMERGENCY)
Facility: HOSPITAL | Age: 45
Discharge: HOME | End: 2025-05-15
Attending: STUDENT IN AN ORGANIZED HEALTH CARE EDUCATION/TRAINING PROGRAM
Payer: MEDICAID

## 2025-05-15 ENCOUNTER — APPOINTMENT (OUTPATIENT)
Facility: HOSPITAL | Age: 45
End: 2025-05-15
Payer: MEDICAID

## 2025-05-15 VITALS
RESPIRATION RATE: 17 BRPM | WEIGHT: 171.3 LBS | SYSTOLIC BLOOD PRESSURE: 135 MMHG | OXYGEN SATURATION: 98 % | BODY MASS INDEX: 29.24 KG/M2 | DIASTOLIC BLOOD PRESSURE: 80 MMHG | HEIGHT: 64 IN | HEART RATE: 87 BPM | TEMPERATURE: 98.5 F

## 2025-05-15 DIAGNOSIS — R07.89 CHEST WALL PAIN: ICD-10-CM

## 2025-05-15 DIAGNOSIS — R07.89 ATYPICAL CHEST PAIN: Primary | ICD-10-CM

## 2025-05-15 LAB
ALBUMIN SERPL-MCNC: 3.2 G/DL (ref 3.5–5)
ALBUMIN/GLOB SERPL: 0.7 (ref 1.1–2.2)
ALP SERPL-CCNC: 72 U/L (ref 45–117)
ALT SERPL-CCNC: 25 U/L (ref 12–78)
ANION GAP SERPL CALC-SCNC: 6 MMOL/L (ref 2–12)
APPEARANCE UR: CLEAR
AST SERPL-CCNC: 20 U/L (ref 15–37)
BACTERIA URNS QL MICRO: NEGATIVE /HPF
BASOPHILS # BLD: 0.06 K/UL (ref 0–0.1)
BASOPHILS NFR BLD: 0.6 % (ref 0–1)
BILIRUB SERPL-MCNC: 0.3 MG/DL (ref 0.2–1)
BILIRUB UR QL: NEGATIVE
BUN SERPL-MCNC: 15 MG/DL (ref 6–20)
BUN/CREAT SERPL: 19 (ref 12–20)
CALCIUM SERPL-MCNC: 8.8 MG/DL (ref 8.5–10.1)
CHLORIDE SERPL-SCNC: 99 MMOL/L (ref 97–108)
CO2 SERPL-SCNC: 30 MMOL/L (ref 21–32)
COLOR UR: ABNORMAL
CREAT SERPL-MCNC: 0.77 MG/DL (ref 0.55–1.02)
D DIMER PPP FEU-MCNC: 0.19 MG/L FEU (ref 0–0.65)
DIFFERENTIAL METHOD BLD: NORMAL
EOSINOPHIL # BLD: 0.07 K/UL (ref 0–0.4)
EOSINOPHIL NFR BLD: 0.7 % (ref 0–7)
EPITH CASTS URNS QL MICRO: ABNORMAL /LPF
ERYTHROCYTE [DISTWIDTH] IN BLOOD BY AUTOMATED COUNT: 14.1 % (ref 11.5–14.5)
GLOBULIN SER CALC-MCNC: 4.4 G/DL (ref 2–4)
GLUCOSE SERPL-MCNC: 242 MG/DL (ref 65–100)
GLUCOSE UR STRIP.AUTO-MCNC: NEGATIVE MG/DL
HCT VFR BLD AUTO: 36.9 % (ref 35–47)
HGB BLD-MCNC: 12.8 G/DL (ref 11.5–16)
HGB UR QL STRIP: NEGATIVE
IMM GRANULOCYTES # BLD AUTO: 0.02 K/UL (ref 0–0.04)
IMM GRANULOCYTES NFR BLD AUTO: 0.2 % (ref 0–0.5)
KETONES UR QL STRIP.AUTO: ABNORMAL MG/DL
LEUKOCYTE ESTERASE UR QL STRIP.AUTO: NEGATIVE
LIPASE SERPL-CCNC: 13 U/L (ref 13–75)
LYMPHOCYTES # BLD: 1.95 K/UL (ref 0.8–3.5)
LYMPHOCYTES NFR BLD: 19.6 % (ref 12–49)
MCH RBC QN AUTO: 30.8 PG (ref 26–34)
MCHC RBC AUTO-ENTMCNC: 34.7 G/DL (ref 30–36.5)
MCV RBC AUTO: 88.7 FL (ref 80–99)
MONOCYTES # BLD: 0.81 K/UL (ref 0–1)
MONOCYTES NFR BLD: 8.1 % (ref 5–13)
NEUTS SEG # BLD: 7.04 K/UL (ref 1.8–8)
NEUTS SEG NFR BLD: 70.8 % (ref 32–75)
NITRITE UR QL STRIP.AUTO: NEGATIVE
NRBC # BLD: 0 K/UL (ref 0–0.01)
NRBC BLD-RTO: 0 PER 100 WBC
PH UR STRIP: 6.5 (ref 5–8)
PLATELET # BLD AUTO: 259 K/UL (ref 150–400)
PMV BLD AUTO: 11.3 FL (ref 8.9–12.9)
POTASSIUM SERPL-SCNC: 4.4 MMOL/L (ref 3.5–5.1)
PROT SERPL-MCNC: 7.6 G/DL (ref 6.4–8.2)
PROT UR STRIP-MCNC: NEGATIVE MG/DL
RBC # BLD AUTO: 4.16 M/UL (ref 3.8–5.2)
RBC #/AREA URNS HPF: ABNORMAL /HPF (ref 0–5)
SODIUM SERPL-SCNC: 135 MMOL/L (ref 136–145)
SP GR UR REFRACTOMETRY: 1.02
TROPONIN I SERPL HS-MCNC: <4 NG/L (ref 0–51)
URINE CULTURE IF INDICATED: ABNORMAL
UROBILINOGEN UR QL STRIP.AUTO: 1 EU/DL (ref 0.2–1)
WBC # BLD AUTO: 10 K/UL (ref 3.6–11)
WBC URNS QL MICRO: ABNORMAL /HPF (ref 0–4)

## 2025-05-15 PROCEDURE — 84484 ASSAY OF TROPONIN QUANT: CPT

## 2025-05-15 PROCEDURE — 83690 ASSAY OF LIPASE: CPT

## 2025-05-15 PROCEDURE — 96361 HYDRATE IV INFUSION ADD-ON: CPT

## 2025-05-15 PROCEDURE — 85025 COMPLETE CBC W/AUTO DIFF WBC: CPT

## 2025-05-15 PROCEDURE — 71045 X-RAY EXAM CHEST 1 VIEW: CPT

## 2025-05-15 PROCEDURE — 80053 COMPREHEN METABOLIC PANEL: CPT

## 2025-05-15 PROCEDURE — 6370000000 HC RX 637 (ALT 250 FOR IP): Performed by: STUDENT IN AN ORGANIZED HEALTH CARE EDUCATION/TRAINING PROGRAM

## 2025-05-15 PROCEDURE — 85379 FIBRIN DEGRADATION QUANT: CPT

## 2025-05-15 PROCEDURE — 6360000002 HC RX W HCPCS: Performed by: STUDENT IN AN ORGANIZED HEALTH CARE EDUCATION/TRAINING PROGRAM

## 2025-05-15 PROCEDURE — 2580000003 HC RX 258: Performed by: STUDENT IN AN ORGANIZED HEALTH CARE EDUCATION/TRAINING PROGRAM

## 2025-05-15 PROCEDURE — 81001 URINALYSIS AUTO W/SCOPE: CPT

## 2025-05-15 PROCEDURE — 93005 ELECTROCARDIOGRAM TRACING: CPT | Performed by: STUDENT IN AN ORGANIZED HEALTH CARE EDUCATION/TRAINING PROGRAM

## 2025-05-15 PROCEDURE — 99285 EMERGENCY DEPT VISIT HI MDM: CPT

## 2025-05-15 PROCEDURE — 96375 TX/PRO/DX INJ NEW DRUG ADDON: CPT

## 2025-05-15 PROCEDURE — 96374 THER/PROPH/DIAG INJ IV PUSH: CPT

## 2025-05-15 RX ORDER — IBUPROFEN 600 MG/1
600 TABLET, FILM COATED ORAL EVERY 6 HOURS PRN
Qty: 50 TABLET | Refills: 1 | Status: SHIPPED | OUTPATIENT
Start: 2025-05-15

## 2025-05-15 RX ORDER — ACETAMINOPHEN 500 MG
1000 TABLET ORAL 3 TIMES DAILY PRN
Qty: 100 TABLET | Refills: 0 | Status: SHIPPED | OUTPATIENT
Start: 2025-05-15

## 2025-05-15 RX ORDER — ONDANSETRON 2 MG/ML
4 INJECTION INTRAMUSCULAR; INTRAVENOUS ONCE
Status: COMPLETED | OUTPATIENT
Start: 2025-05-15 | End: 2025-05-15

## 2025-05-15 RX ORDER — OXYCODONE HYDROCHLORIDE 5 MG/1
5 TABLET ORAL EVERY 6 HOURS PRN
Qty: 8 TABLET | Refills: 0 | Status: SHIPPED | OUTPATIENT
Start: 2025-05-15 | End: 2025-05-18

## 2025-05-15 RX ORDER — ONDANSETRON 4 MG/1
4 TABLET, ORALLY DISINTEGRATING ORAL 3 TIMES DAILY PRN
Qty: 21 TABLET | Refills: 0 | Status: SHIPPED | OUTPATIENT
Start: 2025-05-15

## 2025-05-15 RX ORDER — OXYCODONE HYDROCHLORIDE 5 MG/1
5 TABLET ORAL
Refills: 0 | Status: COMPLETED | OUTPATIENT
Start: 2025-05-15 | End: 2025-05-15

## 2025-05-15 RX ORDER — 0.9 % SODIUM CHLORIDE 0.9 %
1000 INTRAVENOUS SOLUTION INTRAVENOUS ONCE
Status: COMPLETED | OUTPATIENT
Start: 2025-05-15 | End: 2025-05-15

## 2025-05-15 RX ORDER — KETOROLAC TROMETHAMINE 30 MG/ML
15 INJECTION, SOLUTION INTRAMUSCULAR; INTRAVENOUS ONCE
Status: COMPLETED | OUTPATIENT
Start: 2025-05-15 | End: 2025-05-15

## 2025-05-15 RX ADMIN — OXYCODONE HYDROCHLORIDE 5 MG: 5 TABLET ORAL at 19:12

## 2025-05-15 RX ADMIN — ONDANSETRON 4 MG: 2 INJECTION, SOLUTION INTRAMUSCULAR; INTRAVENOUS at 16:53

## 2025-05-15 RX ADMIN — SODIUM CHLORIDE 1000 ML: 0.9 INJECTION, SOLUTION INTRAVENOUS at 16:31

## 2025-05-15 RX ADMIN — KETOROLAC TROMETHAMINE 15 MG: 30 INJECTION, SOLUTION INTRAMUSCULAR at 16:32

## 2025-05-15 ASSESSMENT — PAIN DESCRIPTION - ORIENTATION: ORIENTATION: MID;RIGHT

## 2025-05-15 ASSESSMENT — PAIN DESCRIPTION - DIRECTION: RADIATING_TOWARDS: DOWN RIGHT ARM

## 2025-05-15 ASSESSMENT — PAIN DESCRIPTION - PAIN TYPE: TYPE: ACUTE PAIN

## 2025-05-15 ASSESSMENT — PAIN SCALES - GENERAL
PAINLEVEL_OUTOF10: 8

## 2025-05-15 ASSESSMENT — PAIN DESCRIPTION - DESCRIPTORS: DESCRIPTORS: ACHING

## 2025-05-15 ASSESSMENT — PAIN DESCRIPTION - LOCATION: LOCATION: CHEST;SHOULDER

## 2025-05-15 ASSESSMENT — PAIN DESCRIPTION - FREQUENCY: FREQUENCY: CONTINUOUS

## 2025-05-15 ASSESSMENT — PAIN - FUNCTIONAL ASSESSMENT
PAIN_FUNCTIONAL_ASSESSMENT: 0-10
PAIN_FUNCTIONAL_ASSESSMENT: PREVENTS OR INTERFERES SOME ACTIVE ACTIVITIES AND ADLS

## 2025-05-15 ASSESSMENT — PAIN DESCRIPTION - ONSET: ONSET: PROGRESSIVE

## 2025-05-15 NOTE — ED TRIAGE NOTES
Pt c/o constant mid chest pain with pain to right shoulder and arm and to right flank. Pain x 2 days. +vomiting 1 episode vomiting. Denies SOB at this time

## 2025-05-15 NOTE — ED PROVIDER NOTES
[facility] EMERGENCY DEPARTMENT  EMERGENCY DEPARTMENT ENCOUNTER       Pt Name: Estefania Champion  MRN: [id number]  Birthdate 1980  Date of evaluation: 5/15/2025  Provider: Blake Nichols MD   PCP: Rocío Daniels APRN - NP  Note Started: 6:11 PM EDT 5/15/25     CHIEF COMPLAINT       Chief Complaint   Patient presents with    Chest Pain        HISTORY OF PRESENT ILLNESS: 1 or more elements      History From: Patient  HPI Limitations: None     Estefania Champion is a 44 y.o. female who presents for right-sided chest pain, right thoracic back pain.  Has a history of diabetes and hypertension.  She reports the pains been ongoing for the past few days.  Denies shortness of breath, fever, chills, nausea, vomiting, diarrhea.  She reports the pain is worse with movement, taking deep breaths.  No known history of heart disease.  Denies coughing, sneezing, shortness of breath.  No new symptoms today, patient states the pain has not improved and presents for assessment.     Nursing Notes were all reviewed and agreed with or any disagreements were addressed in the HPI.     REVIEW OF SYSTEMS      Review of Systems     Positives and Pertinent negatives as per HPI.    PAST HISTORY     Past Medical History:  Past Medical History:   Diagnosis Date    Diabetes (HCC)     type 1; endocrinologist at [facility]    Other ill-defined conditions(799.89)     DKA    PNA (pneumonia)     ; strep pneumococcus    Strep pharyngitis 3/13    with sepsis         Past Surgical History:  Past Surgical History:   Procedure Laterality Date    GYN          ORTHOPEDIC SURGERY      left foot surgery       Family History:  Family History   Problem Relation Age of Onset    Cancer Father     Diabetes Mother     Heart Disease Mother        Social History:  Social History     Tobacco Use    Smoking status: Every Day     Current packs/day: 0.25     Types: Cigarettes    Smokeless tobacco: Never   Substance Use Topics    Alcohol use: Yes    Drug  97 - 108 mmol/L    CO2 30 21 - 32 mmol/L    Anion Gap 6 2 - 12 mmol/L    Glucose 242 (H) 65 - 100 mg/dL    BUN 15 6 - 20 MG/DL    Creatinine 0.77 0.55 - 1.02 MG/DL    BUN/Creatinine Ratio 19 12 - 20      Est, Glom Filt Rate >90 >60 ml/min/1.73m2    Calcium 8.8 8.5 - 10.1 MG/DL    Total Bilirubin 0.3 0.2 - 1.0 MG/DL    ALT 25 12 - 78 U/L    AST 20 15 - 37 U/L    Alk Phosphatase 72 45 - 117 U/L    Total Protein 7.6 6.4 - 8.2 g/dL    Albumin 3.2 (L) 3.5 - 5.0 g/dL    Globulin 4.4 (H) 2.0 - 4.0 g/dL    Albumin/Globulin Ratio 0.7 (L) 1.1 - 2.2     Lipase    Collection Time: 05/15/25  4:23 PM   Result Value Ref Range    Lipase 13 13 - 75 U/L   Troponin    Collection Time: 05/15/25  4:23 PM   Result Value Ref Range    Troponin, High Sensitivity <4 0 - 51 ng/L   D-Dimer, Quantitative    Collection Time: 05/15/25  4:23 PM   Result Value Ref Range    D-Dimer, Quant 0.19 0.00 - 0.65 mg/L FEU   Urinalysis with Reflex to Culture    Collection Time: 05/15/25  6:22 PM    Specimen: Urine   Result Value Ref Range    Color, UA YELLOW/STRAW      Appearance CLEAR CLEAR      Specific Gravity, UA 1.025      pH, Urine 6.5 5.0 - 8.0      Protein, UA Negative NEG mg/dL    Glucose, Ur Negative NEG mg/dL    Ketones, Urine TRACE (A) NEG mg/dL    Bilirubin, Urine Negative NEG      Blood, Urine Negative NEG      Urobilinogen, Urine 1.0 0.2 - 1.0 EU/dL    Nitrite, Urine Negative NEG      Leukocyte Esterase, Urine Negative NEG      WBC, UA 0-4 0 - 4 /hpf    RBC, UA 0-5 0 - 5 /hpf    Epithelial Cells, UA FEW FEW /lpf    BACTERIA, URINE Negative NEG /hpf    Urine Culture if Indicated CULTURE NOT INDICATED BY UA RESULT CNI             EKG: EKG interpreted by me.     RADIOLOGY:  Non-plain film images such as CT, Ultrasound and MRI are read by the radiologist. Plain radiographic images are visualized and preliminarily interpreted by the ED Provider with the below findings:          Interpretation per the Radiologist below, if available at the time of

## 2025-05-15 NOTE — ED NOTES
Pt presents to ED via FIFI Hernández complaining of chest pain, right arm pain, and n/v for two days. Pt is alert and oriented x 4, RR even and unlabored, skin is warm and dry. Pt appears in NAD at this time. Assessment completed and pt updated on plan of care.  Call bell in reach.   Emergency Department Nursing Plan of Care  The Nursing Plan of Care is developed from the Nursing assessment and Emergency Department Attending provider initial evaluation.  The plan of care may be reviewed in the “ED Provider note”.  The Plan of Care was developed with the following considerations:  Patient / Family readiness to learn indicated by:Refer to Medical chart in [facility]  Persons(s) to be included in education: Refer to Medical chart in [facility]  Barriers to Learning/Limitations:Normal

## 2025-05-15 NOTE — ED NOTES
Pt alert, oriented, and ambulated without difficulty. Pt verbalizes understanding of DC instructions. All belongings with patient at this time.   Pt stated she will be picked up by friend.

## 2025-05-16 LAB
EKG ATRIAL RATE: 108 BPM
EKG DIAGNOSIS: NORMAL
EKG P AXIS: 77 DEGREES
EKG P-R INTERVAL: 134 MS
EKG Q-T INTERVAL: 342 MS
EKG QRS DURATION: 82 MS
EKG QTC CALCULATION (BAZETT): 458 MS
EKG R AXIS: 95 DEGREES
EKG T AXIS: 67 DEGREES
EKG VENTRICULAR RATE: 108 BPM

## 2025-05-16 PROCEDURE — 93010 ELECTROCARDIOGRAM REPORT: CPT | Performed by: SPECIALIST

## 2025-07-22 ENCOUNTER — HOSPITAL ENCOUNTER (EMERGENCY)
Facility: HOSPITAL | Age: 45
Discharge: HOME | End: 2025-07-22
Payer: MEDICAID

## 2025-07-22 VITALS
OXYGEN SATURATION: 97 % | DIASTOLIC BLOOD PRESSURE: 93 MMHG | HEART RATE: 92 BPM | WEIGHT: 166.01 LBS | SYSTOLIC BLOOD PRESSURE: 151 MMHG | TEMPERATURE: 98.6 F | BODY MASS INDEX: 28.34 KG/M2 | RESPIRATION RATE: 18 BRPM | HEIGHT: 64 IN

## 2025-07-22 DIAGNOSIS — R22.0 SWELLING OF LEFT SIDE OF FACE: ICD-10-CM

## 2025-07-22 DIAGNOSIS — K08.89 DENTALGIA: Primary | ICD-10-CM

## 2025-07-22 PROCEDURE — 6370000000 HC RX 637 (ALT 250 FOR IP): Performed by: PHYSICIAN ASSISTANT

## 2025-07-22 PROCEDURE — 99283 EMERGENCY DEPT VISIT LOW MDM: CPT

## 2025-07-22 RX ORDER — HYDROCODONE BITARTRATE AND ACETAMINOPHEN 5; 325 MG/1; MG/1
1 TABLET ORAL EVERY 6 HOURS PRN
Qty: 8 TABLET | Refills: 0 | Status: SHIPPED | OUTPATIENT
Start: 2025-07-22 | End: 2025-07-25

## 2025-07-22 RX ORDER — AMOXICILLIN 500 MG/1
500 CAPSULE ORAL 3 TIMES DAILY
Qty: 21 CAPSULE | Refills: 0 | Status: SHIPPED | OUTPATIENT
Start: 2025-07-22 | End: 2025-07-29

## 2025-07-22 RX ORDER — NAPROXEN 500 MG/1
500 TABLET ORAL 2 TIMES DAILY PRN
Qty: 20 TABLET | Refills: 0 | Status: SHIPPED | OUTPATIENT
Start: 2025-07-22

## 2025-07-22 RX ADMIN — DIPHENHYDRAMINE HYDROCHLORIDE: 25 SOLUTION ORAL at 12:07

## 2025-07-22 ASSESSMENT — PAIN DESCRIPTION - LOCATION: LOCATION: TEETH

## 2025-07-22 ASSESSMENT — PAIN DESCRIPTION - ORIENTATION: ORIENTATION: LEFT

## 2025-07-22 ASSESSMENT — PAIN SCALES - GENERAL: PAINLEVEL_OUTOF10: 8

## 2025-07-22 ASSESSMENT — PAIN - FUNCTIONAL ASSESSMENT: PAIN_FUNCTIONAL_ASSESSMENT: 0-10

## 2025-07-22 NOTE — ED NOTES
Discharge instructions were given to the patient by FIFI Thornton.     The patient left the Emergency Department alert and oriented and in no acute distress with 3 prescriptions. The patient was encouraged to call or return to the ED for worsening issues or problems and was encouraged to schedule a follow up appointment for continuing care.     Ambulation assessment completed before discharge.  Pt left Emergency Department at expected ambulatory status with no ortho devices  Ortho device education: none    The patient verbalized understanding of discharge instructions and prescriptions, all questions were answered. The patient has no further concerns at this time.

## 2025-07-22 NOTE — ED PROVIDER NOTES
[facility] EMERGENCY DEPARTMENT  EMERGENCY DEPARTMENT ENCOUNTER         Pt Name: Estefania Champion  MRN: [id number]  Birthdate 1980  Date of evaluation: 7/22/2025  Provider: Misael Calderon PA-C   PCP: Rocío Daniels APRN - NP  Note Started: 1:09 PM EDT 7/22/25     CHIEF COMPLAINT       Chief Complaint   Patient presents with    Dental Pain        HISTORY OF PRESENT ILLNESS: 1 or more elements      History From: Patient  HPI Limitations: None  Arrival Mode: private vehicle    Chief Complaint  Dental pain for 2 days, swollen lip and teeth pain since this morning    History of Present Illness  SHOLA Champion is a 44-year-old female presenting to the Emergency Department with dental and lip pain. The patient reports experiencing dental pain for approximately 2 days, with worsening symptoms and swelling upon waking this morning.     The pain is localized to her lip, teeth, and the entire side of her face. She describes the pain as affecting \"my whole side\" and notes that \"everything hurts, my teeth, everything.\" The patient mentions that her lip is visibly swollen, which is confirmed by the provider. She reports sensitivity in her teeth, particularly around a gold tooth. The patient states that this type of issue has occurred before, but emphasizes that the current episode is \"bad.\"    The patient has not taken any pain medication today, though she indicates having taken something previously without specifying the type or timing. She denies any fever. The patient does not report any specific aggravating or alleviating factors, nor does she mention any impact on daily functioning or associated symptoms beyond the swelling and pain.        Nursing Notes were all reviewed and agreed with or any disagreements were addressed in the HPI.  Please see MDM for additional details of HPI and ROS     REVIEW OF SYSTEMS      Review of Systems     Positives and Pertinent negatives as per HPI.    PAST HISTORY     Past

## 2025-07-22 NOTE — ED TRIAGE NOTES
Patient arrives to triage with complaints of left upper tooth pain and gum swelling x2 days. She denies any broken teeth, cavities, or injury.

## 2025-07-22 NOTE — DISCHARGE INSTRUCTIONS
You can also follow up with [facility] [address] [place] [address]  [address]  [facility] [address] [phone]    Or     [place] [id number] [facility], [place] [address], [facility]  [phone]